# Patient Record
Sex: FEMALE | Race: WHITE | NOT HISPANIC OR LATINO
[De-identification: names, ages, dates, MRNs, and addresses within clinical notes are randomized per-mention and may not be internally consistent; named-entity substitution may affect disease eponyms.]

---

## 2017-02-26 ENCOUNTER — RX RENEWAL (OUTPATIENT)
Age: 37
End: 2017-02-26

## 2017-04-09 ENCOUNTER — RX RENEWAL (OUTPATIENT)
Age: 37
End: 2017-04-09

## 2017-08-03 ENCOUNTER — RX RENEWAL (OUTPATIENT)
Age: 37
End: 2017-08-03

## 2017-08-17 ENCOUNTER — MEDICATION RENEWAL (OUTPATIENT)
Age: 37
End: 2017-08-17

## 2017-12-12 ENCOUNTER — RX RENEWAL (OUTPATIENT)
Age: 37
End: 2017-12-12

## 2018-01-17 ENCOUNTER — RX RENEWAL (OUTPATIENT)
Age: 38
End: 2018-01-17

## 2018-01-18 ENCOUNTER — TRANSCRIPTION ENCOUNTER (OUTPATIENT)
Age: 38
End: 2018-01-18

## 2018-01-26 ENCOUNTER — APPOINTMENT (OUTPATIENT)
Dept: INTERNAL MEDICINE | Facility: CLINIC | Age: 38
End: 2018-01-26
Payer: COMMERCIAL

## 2018-01-26 VITALS
HEIGHT: 61 IN | TEMPERATURE: 97.9 F | SYSTOLIC BLOOD PRESSURE: 110 MMHG | OXYGEN SATURATION: 99 % | DIASTOLIC BLOOD PRESSURE: 80 MMHG | BODY MASS INDEX: 27.38 KG/M2 | WEIGHT: 145 LBS | HEART RATE: 75 BPM

## 2018-01-26 VITALS — DIASTOLIC BLOOD PRESSURE: 75 MMHG | SYSTOLIC BLOOD PRESSURE: 120 MMHG

## 2018-01-26 PROCEDURE — 99395 PREV VISIT EST AGE 18-39: CPT | Mod: 25

## 2018-01-26 PROCEDURE — 36415 COLL VENOUS BLD VENIPUNCTURE: CPT

## 2018-01-27 LAB
ALBUMIN SERPL ELPH-MCNC: 4.5 G/DL
ALP BLD-CCNC: 52 U/L
ALT SERPL-CCNC: 13 U/L
ANION GAP SERPL CALC-SCNC: 15 MMOL/L
APPEARANCE: CLEAR
AST SERPL-CCNC: 21 U/L
BACTERIA: NEGATIVE
BASOPHILS # BLD AUTO: 0.03 K/UL
BASOPHILS NFR BLD AUTO: 0.4 %
BILIRUB SERPL-MCNC: 0.5 MG/DL
BILIRUBIN URINE: NEGATIVE
BLOOD URINE: NEGATIVE
BUN SERPL-MCNC: 14 MG/DL
CALCIUM SERPL-MCNC: 9.7 MG/DL
CHLORIDE SERPL-SCNC: 99 MMOL/L
CHOLEST SERPL-MCNC: 184 MG/DL
CHOLEST/HDLC SERPL: 2.1 RATIO
CO2 SERPL-SCNC: 26 MMOL/L
COLOR: YELLOW
CREAT SERPL-MCNC: 0.92 MG/DL
EOSINOPHIL # BLD AUTO: 0.34 K/UL
EOSINOPHIL NFR BLD AUTO: 4.5 %
GLUCOSE QUALITATIVE U: NEGATIVE MG/DL
GLUCOSE SERPL-MCNC: 72 MG/DL
HBA1C MFR BLD HPLC: 4.8 %
HCT VFR BLD CALC: 42.5 %
HDLC SERPL-MCNC: 89 MG/DL
HGB BLD-MCNC: 14 G/DL
HYALINE CASTS: 0 /LPF
IMM GRANULOCYTES NFR BLD AUTO: 0.3 %
KETONES URINE: ABNORMAL
LDLC SERPL CALC-MCNC: 86 MG/DL
LEUKOCYTE ESTERASE URINE: NEGATIVE
LYMPHOCYTES # BLD AUTO: 2.76 K/UL
LYMPHOCYTES NFR BLD AUTO: 36.2 %
MAN DIFF?: NORMAL
MCHC RBC-ENTMCNC: 30 PG
MCHC RBC-ENTMCNC: 32.9 GM/DL
MCV RBC AUTO: 91 FL
MICROSCOPIC-UA: NORMAL
MONOCYTES # BLD AUTO: 0.58 K/UL
MONOCYTES NFR BLD AUTO: 7.6 %
NEUTROPHILS # BLD AUTO: 3.89 K/UL
NEUTROPHILS NFR BLD AUTO: 51 %
NITRITE URINE: NEGATIVE
PH URINE: 6
PLATELET # BLD AUTO: 212 K/UL
POTASSIUM SERPL-SCNC: 4.5 MMOL/L
PROT SERPL-MCNC: 8.2 G/DL
PROTEIN URINE: NEGATIVE MG/DL
RBC # BLD: 4.67 M/UL
RBC # FLD: 13.2 %
RED BLOOD CELLS URINE: 0 /HPF
SODIUM SERPL-SCNC: 140 MMOL/L
SPECIFIC GRAVITY URINE: 1.01
SQUAMOUS EPITHELIAL CELLS: 1 /HPF
T4 FREE SERPL-MCNC: 1.1 NG/DL
TRIGL SERPL-MCNC: 44 MG/DL
TSH SERPL-ACNC: 2.08 UIU/ML
UROBILINOGEN URINE: NEGATIVE MG/DL
WBC # FLD AUTO: 7.62 K/UL
WHITE BLOOD CELLS URINE: 0 /HPF

## 2018-05-29 ENCOUNTER — RX RENEWAL (OUTPATIENT)
Age: 38
End: 2018-05-29

## 2018-06-13 ENCOUNTER — RESULT REVIEW (OUTPATIENT)
Age: 38
End: 2018-06-13

## 2018-07-05 ENCOUNTER — APPOINTMENT (OUTPATIENT)
Dept: MAMMOGRAPHY | Facility: CLINIC | Age: 38
End: 2018-07-05
Payer: COMMERCIAL

## 2018-07-05 ENCOUNTER — OUTPATIENT (OUTPATIENT)
Dept: OUTPATIENT SERVICES | Facility: HOSPITAL | Age: 38
LOS: 1 days | End: 2018-07-05
Payer: COMMERCIAL

## 2018-07-05 ENCOUNTER — APPOINTMENT (OUTPATIENT)
Dept: ULTRASOUND IMAGING | Facility: CLINIC | Age: 38
End: 2018-07-05
Payer: COMMERCIAL

## 2018-07-05 DIAGNOSIS — Z00.8 ENCOUNTER FOR OTHER GENERAL EXAMINATION: ICD-10-CM

## 2018-07-05 PROCEDURE — 76642 ULTRASOUND BREAST LIMITED: CPT

## 2018-07-05 PROCEDURE — 76641 ULTRASOUND BREAST COMPLETE: CPT

## 2018-07-05 PROCEDURE — 77066 DX MAMMO INCL CAD BI: CPT | Mod: 26

## 2018-07-05 PROCEDURE — G0279: CPT | Mod: 26

## 2018-07-05 PROCEDURE — G0279: CPT

## 2018-07-05 PROCEDURE — 77066 DX MAMMO INCL CAD BI: CPT

## 2018-07-05 PROCEDURE — 76642 ULTRASOUND BREAST LIMITED: CPT | Mod: 26,LT

## 2018-10-01 ENCOUNTER — APPOINTMENT (OUTPATIENT)
Dept: INTERNAL MEDICINE | Facility: CLINIC | Age: 38
End: 2018-10-01
Payer: SELF-PAY

## 2018-10-01 VITALS
BODY MASS INDEX: 27.38 KG/M2 | WEIGHT: 145 LBS | SYSTOLIC BLOOD PRESSURE: 120 MMHG | HEIGHT: 61 IN | DIASTOLIC BLOOD PRESSURE: 60 MMHG | OXYGEN SATURATION: 96 % | HEART RATE: 84 BPM | TEMPERATURE: 98.5 F

## 2018-10-01 PROCEDURE — 99213 OFFICE O/P EST LOW 20 MIN: CPT

## 2018-10-01 NOTE — PHYSICAL EXAM
[No Acute Distress] : no acute distress [Well Nourished] : well nourished [Well Developed] : well developed [Normal Oropharynx] : the oropharynx was normal [Normal TMs] : both tympanic membranes were normal [Supple] : supple [No Lymphadenopathy] : no lymphadenopathy [No Respiratory Distress] : no respiratory distress  [Clear to Auscultation] : lungs were clear to auscultation bilaterally [Normal Rate] : normal rate  [Normal S1, S2] : normal S1 and S2 [No Murmur] : no murmur heard [No Edema] : there was no peripheral edema [Soft] : abdomen soft [Non Tender] : non-tender [de-identified] : OP clear, ears clear

## 2018-10-01 NOTE — HISTORY OF PRESENT ILLNESS
[FreeTextEntry8] : Pt comes for an acute visit.\par \par She complains of productive cough of yellow-green sputum, dyspnea, low grade fever, and chest congestion x 3 days. Started on Friday. She has intermittent fever w/ chills. Her 4 yr old son has been sick with bronchitis. 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 She is taking albuterol inhaler with some relief. She is using Nyquil.

## 2018-10-01 NOTE — ASSESSMENT
[FreeTextEntry1] : Suspect URI likely bronchitis. Doubtful of influenza. Send Z-pack. Albuterol inhaler refilled. Advised rest, fluids, NSAIDs or tylenol. Monitor temps. Dayquil and Nyquil PRN. Call back office PRN.

## 2018-10-01 NOTE — REVIEW OF SYSTEMS
[Fever] : fever [Chills] : chills [Fatigue] : fatigue [Earache] : no earache [Nasal Discharge] : no nasal discharge [Sore Throat] : no sore throat [Chest Pain] : no chest pain [Shortness Of Breath] : shortness of breath [Wheezing] : no wheezing [Cough] : cough [Abdominal Pain] : no abdominal pain [Nausea] : no nausea [Diarrhea] : diarrhea

## 2018-10-19 ENCOUNTER — MEDICATION RENEWAL (OUTPATIENT)
Age: 38
End: 2018-10-19

## 2019-01-31 ENCOUNTER — APPOINTMENT (OUTPATIENT)
Dept: INTERNAL MEDICINE | Facility: CLINIC | Age: 39
End: 2019-01-31
Payer: COMMERCIAL

## 2019-01-31 VITALS — SYSTOLIC BLOOD PRESSURE: 110 MMHG | DIASTOLIC BLOOD PRESSURE: 62 MMHG

## 2019-01-31 VITALS
BODY MASS INDEX: 27.56 KG/M2 | HEART RATE: 75 BPM | TEMPERATURE: 98.1 F | DIASTOLIC BLOOD PRESSURE: 60 MMHG | SYSTOLIC BLOOD PRESSURE: 100 MMHG | WEIGHT: 146 LBS | HEIGHT: 61 IN | OXYGEN SATURATION: 99 %

## 2019-01-31 DIAGNOSIS — Z87.09 PERSONAL HISTORY OF OTHER DISEASES OF THE RESPIRATORY SYSTEM: ICD-10-CM

## 2019-01-31 PROCEDURE — G0444 DEPRESSION SCREEN ANNUAL: CPT

## 2019-01-31 PROCEDURE — 36415 COLL VENOUS BLD VENIPUNCTURE: CPT

## 2019-01-31 PROCEDURE — 99395 PREV VISIT EST AGE 18-39: CPT | Mod: 25

## 2019-01-31 RX ORDER — AZITHROMYCIN 250 MG/1
250 TABLET, FILM COATED ORAL
Qty: 1 | Refills: 0 | Status: DISCONTINUED | COMMUNITY
Start: 2018-10-01 | End: 2019-01-31

## 2019-03-15 LAB
ALBUMIN SERPL ELPH-MCNC: 4.6 G/DL
ALP BLD-CCNC: 45 U/L
ALT SERPL-CCNC: 11 U/L
ANION GAP SERPL CALC-SCNC: 12 MMOL/L
APPEARANCE: CLEAR
AST SERPL-CCNC: 15 U/L
BACTERIA: NEGATIVE
BASOPHILS # BLD AUTO: 0.04 K/UL
BASOPHILS NFR BLD AUTO: 0.6 %
BILIRUB SERPL-MCNC: 0.4 MG/DL
BILIRUBIN URINE: NEGATIVE
BLOOD URINE: NEGATIVE
BUN SERPL-MCNC: 13 MG/DL
CALCIUM SERPL-MCNC: 9.1 MG/DL
CHLORIDE SERPL-SCNC: 99 MMOL/L
CHOLEST SERPL-MCNC: 173 MG/DL
CHOLEST/HDLC SERPL: 1.7 RATIO
CO2 SERPL-SCNC: 26 MMOL/L
COLOR: YELLOW
CREAT SERPL-MCNC: 0.66 MG/DL
EOSINOPHIL # BLD AUTO: 0.21 K/UL
EOSINOPHIL NFR BLD AUTO: 3.2 %
GLUCOSE QUALITATIVE U: NEGATIVE MG/DL
GLUCOSE SERPL-MCNC: 126 MG/DL
HBA1C MFR BLD HPLC: 4.7 %
HCT VFR BLD CALC: 40.2 %
HDLC SERPL-MCNC: 99 MG/DL
HGB BLD-MCNC: 13.1 G/DL
HYALINE CASTS: 0 /LPF
IMM GRANULOCYTES NFR BLD AUTO: 0.2 %
KETONES URINE: ABNORMAL
LDLC SERPL CALC-MCNC: 69 MG/DL
LEUKOCYTE ESTERASE URINE: NEGATIVE
LYMPHOCYTES # BLD AUTO: 2.26 K/UL
LYMPHOCYTES NFR BLD AUTO: 34.8 %
MAN DIFF?: NORMAL
MCHC RBC-ENTMCNC: 29.5 PG
MCHC RBC-ENTMCNC: 32.6 GM/DL
MCV RBC AUTO: 90.5 FL
MICROSCOPIC-UA: NORMAL
MONOCYTES # BLD AUTO: 0.6 K/UL
MONOCYTES NFR BLD AUTO: 9.2 %
NEUTROPHILS # BLD AUTO: 3.37 K/UL
NEUTROPHILS NFR BLD AUTO: 52 %
NITRITE URINE: NEGATIVE
PH URINE: 6.5
PLATELET # BLD AUTO: 213 K/UL
POTASSIUM SERPL-SCNC: 3.5 MMOL/L
PROT SERPL-MCNC: 7.4 G/DL
PROTEIN URINE: NEGATIVE MG/DL
RBC # BLD: 4.44 M/UL
RBC # FLD: 13.8 %
RED BLOOD CELLS URINE: 3 /HPF
SODIUM SERPL-SCNC: 137 MMOL/L
SPECIFIC GRAVITY URINE: 1.03
SQUAMOUS EPITHELIAL CELLS: 1 /HPF
T4 FREE SERPL-MCNC: 1 NG/DL
TRIGL SERPL-MCNC: 27 MG/DL
TSH SERPL-ACNC: 2.46 UIU/ML
UROBILINOGEN URINE: NEGATIVE MG/DL
WBC # FLD AUTO: 6.49 K/UL
WHITE BLOOD CELLS URINE: 0 /HPF

## 2019-03-15 NOTE — ASSESSMENT
[FreeTextEntry1] : She feels well and is doing well from a psychiatric standpoint.  Continue Fluoxetine 50 mg.\par \par Discussed diet and exercise.  She should try to be more careful.  Check routine blood tests- note she is not fasting.\par \par She smokes an occasional cigar and she was counseled to quit.\par \par She saw her gyn 6/18.  Mammogram and breast U/S 7/18- she was advised to have the reports sent.  \par \par She sees her dermatologist yearly.\par \par She isn't sure if she had the flu vaccine and she will check with her pharmacy.  She should have it if not yet done.

## 2019-03-15 NOTE — HISTORY OF PRESENT ILLNESS
[FreeTextEntry1] : The patient is here for a routine visit.  [de-identified] : She feels well.  She exercises three days per week.  She tries to watch her diet but she is not strict.  \par \par The anxiety, depression and OCD are very well controlled on the Fluoxetine 50 mg and she feels well.\par \par Imitrex works well for the migraines which she has a few times per month.

## 2019-03-15 NOTE — HEALTH RISK ASSESSMENT
[No falls in past year] : Patient reported no falls in the past year [0] : 2) Feeling down, depressed, or hopeless: Not at all (0) [] : No [[id number]] : 0 [Change in mental status noted] : No change in mental status noted [Reports changes in hearing] : Reports no changes in hearing [Reports changes in vision] : Reports no changes in vision [Reports changes in dental health] : Reports no changes in dental health

## 2019-04-05 ENCOUNTER — MEDICATION RENEWAL (OUTPATIENT)
Age: 39
End: 2019-04-05

## 2019-04-23 ENCOUNTER — APPOINTMENT (OUTPATIENT)
Dept: INTERNAL MEDICINE | Facility: CLINIC | Age: 39
End: 2019-04-23
Payer: COMMERCIAL

## 2019-04-23 VITALS
TEMPERATURE: 98.2 F | WEIGHT: 144 LBS | SYSTOLIC BLOOD PRESSURE: 120 MMHG | HEART RATE: 84 BPM | OXYGEN SATURATION: 98 % | HEIGHT: 61 IN | DIASTOLIC BLOOD PRESSURE: 70 MMHG | BODY MASS INDEX: 27.19 KG/M2

## 2019-04-23 DIAGNOSIS — F41.9 ANXIETY DISORDER, UNSPECIFIED: ICD-10-CM

## 2019-04-23 PROCEDURE — 99213 OFFICE O/P EST LOW 20 MIN: CPT

## 2019-04-23 NOTE — HISTORY OF PRESENT ILLNESS
[de-identified] : The patient has a very stressful job situation.  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  She finds it anxiety-provoking and she has trouble relaxing at home.  She says Alprazolam has helped her in the past and she requests a renewal. \par \par She is doing well from a depression and OCD standpoint.

## 2019-04-23 NOTE — ASSESSMENT
[FreeTextEntry1] : She was counseled regarding the situation at work.  She has anxiety and trouble relaxing after getting home.  I agree Alprazolam is appropriate and she was given a prescription for 0.25 mg which has helped in the past.  She will only take it when home and she knows not to combine with ETOH or take when driving or at work.  Call PRN.\par \par She is doing well otherwise from a psychiatric standpoint and the Fluoxetine 50 mg was renewed.

## 2019-04-23 NOTE — PHYSICAL EXAM
[No Acute Distress] : no acute distress [Well Nourished] : well nourished [Well Developed] : well developed [Coordination Grossly Intact] : coordination grossly intact [Normal Gait] : normal gait [No Focal Deficits] : no focal deficits [Memory Grossly Normal] : memory grossly normal [Speech Grossly Normal] : speech grossly normal [Normal Mood] : the mood was normal [Normal Affect] : the affect was normal

## 2019-06-12 ENCOUNTER — APPOINTMENT (OUTPATIENT)
Dept: INTERNAL MEDICINE | Facility: CLINIC | Age: 39
End: 2019-06-12
Payer: COMMERCIAL

## 2019-06-12 VITALS
BODY MASS INDEX: 27.75 KG/M2 | SYSTOLIC BLOOD PRESSURE: 100 MMHG | OXYGEN SATURATION: 99 % | HEART RATE: 73 BPM | DIASTOLIC BLOOD PRESSURE: 70 MMHG | HEIGHT: 61 IN | TEMPERATURE: 98 F | WEIGHT: 147 LBS

## 2019-06-12 PROCEDURE — 99213 OFFICE O/P EST LOW 20 MIN: CPT | Mod: 25

## 2019-06-12 PROCEDURE — 87880 STREP A ASSAY W/OPTIC: CPT | Mod: QW

## 2019-06-12 NOTE — REVIEW OF SYSTEMS
[Fever] : fever [Chills] : chills [Fatigue] : fatigue [Shortness Of Breath] : shortness of breath [Cough] : cough [Earache] : no earache [Chest Pain] : no chest pain [Sore Throat] : no sore throat [Nasal Discharge] : no nasal discharge [Wheezing] : no wheezing [Abdominal Pain] : no abdominal pain [Nausea] : no nausea [Diarrhea] : diarrhea

## 2019-06-12 NOTE — HISTORY OF PRESENT ILLNESS
[FreeTextEntry8] : Patient comes for an acute visit. \par \par She is here for evaluation of sore throat, cough, and fatigue x 3 days. Getting worse. Cough is dry. Has green nasal discharge. She had pink eye last week and took course of Ofloxacin drops (prescribed by her daughter's pediatrician). No fever or chills. Her daughter was recently sick with sore throat, strep testing was negative. She is taking Advil cold & sinus. \par

## 2019-06-12 NOTE — PHYSICAL EXAM
[No Acute Distress] : no acute distress [Well Nourished] : well nourished [Normal Oropharynx] : the oropharynx was normal [Well Developed] : well developed [Supple] : supple [Normal TMs] : both tympanic membranes were normal [No Lymphadenopathy] : no lymphadenopathy [Clear to Auscultation] : lungs were clear to auscultation bilaterally [Normal Rate] : normal rate  [No Respiratory Distress] : no respiratory distress  [Normal S1, S2] : normal S1 and S2 [No Edema] : there was no peripheral edema [No Murmur] : no murmur heard [Soft] : abdomen soft [Non Tender] : non-tender [No Rash] : no rash [Normal Mood] : the mood was normal [Normal Gait] : normal gait [de-identified] : OP with minimal erythema but no exudate, ears clear

## 2019-06-12 NOTE — ASSESSMENT
[FreeTextEntry1] : Patient likely with viral URI. Rapid strep testing was negative. Continue Advil cold & sinus. Rest, fluids, lozenges. If sx worsen in next few days, patient advised to call back office. Excuse note for work given to patient.

## 2019-07-17 ENCOUNTER — MEDICATION RENEWAL (OUTPATIENT)
Age: 39
End: 2019-07-17

## 2019-10-23 ENCOUNTER — MEDICATION RENEWAL (OUTPATIENT)
Age: 39
End: 2019-10-23

## 2019-12-31 ENCOUNTER — RESULT REVIEW (OUTPATIENT)
Age: 39
End: 2019-12-31

## 2020-01-04 ENCOUNTER — RX RENEWAL (OUTPATIENT)
Age: 40
End: 2020-01-04

## 2020-01-07 ENCOUNTER — MEDICATION RENEWAL (OUTPATIENT)
Age: 40
End: 2020-01-07

## 2020-04-03 ENCOUNTER — APPOINTMENT (OUTPATIENT)
Dept: INTERNAL MEDICINE | Facility: CLINIC | Age: 40
End: 2020-04-03
Payer: COMMERCIAL

## 2020-04-03 PROCEDURE — G2012 BRIEF CHECK IN BY MD/QHP: CPT

## 2020-04-17 ENCOUNTER — APPOINTMENT (OUTPATIENT)
Dept: INTERNAL MEDICINE | Facility: CLINIC | Age: 40
End: 2020-04-17

## 2020-05-04 ENCOUNTER — RX RENEWAL (OUTPATIENT)
Age: 40
End: 2020-05-04

## 2020-09-01 ENCOUNTER — APPOINTMENT (OUTPATIENT)
Dept: INTERNAL MEDICINE | Facility: CLINIC | Age: 40
End: 2020-09-01
Payer: COMMERCIAL

## 2020-09-01 VITALS
BODY MASS INDEX: 26.68 KG/M2 | WEIGHT: 145 LBS | TEMPERATURE: 97.8 F | HEART RATE: 66 BPM | OXYGEN SATURATION: 98 % | HEIGHT: 62 IN

## 2020-09-01 DIAGNOSIS — Z87.09 PERSONAL HISTORY OF OTHER DISEASES OF THE RESPIRATORY SYSTEM: ICD-10-CM

## 2020-09-01 DIAGNOSIS — F41.8 OTHER SPECIFIED ANXIETY DISORDERS: ICD-10-CM

## 2020-09-01 PROCEDURE — 99214 OFFICE O/P EST MOD 30 MIN: CPT

## 2020-09-08 NOTE — ASSESSMENT
[FreeTextEntry1] : Her medical situation was reviewed and she was counseled.  We will provide a letter for her  for work advising that she work remotely given her medical condition.  \par \par She is doing well from a psychiatric standpoint.  She was counseled.  Alprazolam renewed.  \par \par CDC guidelines regarding asthma reviewed- required for the letter.

## 2020-09-08 NOTE — HISTORY OF PRESENT ILLNESS
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  She will be going back to work as the school year starts and she is concerned given COVID-19.  She has a history of asthma, bronchitis, frequent URIs.    She is requesting a medical accommodation to work remotely from home given her medical conditions.  She needs a letter documenting her medical condition.  \par \par She is doing well from an anxiety, depression and OCD standpoint.  Stable on meds.

## 2020-12-01 ENCOUNTER — APPOINTMENT (OUTPATIENT)
Dept: INTERNAL MEDICINE | Facility: CLINIC | Age: 40
End: 2020-12-01
Payer: COMMERCIAL

## 2020-12-01 VITALS
TEMPERATURE: 98.2 F | OXYGEN SATURATION: 97 % | RESPIRATION RATE: 16 BRPM | BODY MASS INDEX: 27.21 KG/M2 | HEIGHT: 61.5 IN | WEIGHT: 146 LBS | HEART RATE: 78 BPM

## 2020-12-01 DIAGNOSIS — J06.9 ACUTE UPPER RESPIRATORY INFECTION, UNSPECIFIED: ICD-10-CM

## 2020-12-01 PROCEDURE — 90686 IIV4 VACC NO PRSV 0.5 ML IM: CPT

## 2020-12-01 PROCEDURE — 99396 PREV VISIT EST AGE 40-64: CPT | Mod: 25

## 2020-12-01 PROCEDURE — 36415 COLL VENOUS BLD VENIPUNCTURE: CPT

## 2020-12-01 PROCEDURE — G0008: CPT

## 2020-12-01 PROCEDURE — G0444 DEPRESSION SCREEN ANNUAL: CPT

## 2020-12-01 PROCEDURE — 99072 ADDL SUPL MATRL&STAF TM PHE: CPT

## 2020-12-01 RX ORDER — OFLOXACIN 3 MG/ML
0.3 SOLUTION/ DROPS OPHTHALMIC
Qty: 5 | Refills: 0 | Status: DISCONTINUED | COMMUNITY
Start: 2019-06-05 | End: 2020-12-01

## 2020-12-03 ENCOUNTER — RX RENEWAL (OUTPATIENT)
Age: 40
End: 2020-12-03

## 2020-12-07 ENCOUNTER — NON-APPOINTMENT (OUTPATIENT)
Age: 40
End: 2020-12-07

## 2020-12-07 VITALS — DIASTOLIC BLOOD PRESSURE: 72 MMHG | SYSTOLIC BLOOD PRESSURE: 120 MMHG

## 2020-12-07 LAB
25(OH)D3 SERPL-MCNC: 35.1 NG/ML
ALBUMIN SERPL ELPH-MCNC: 4.9 G/DL
ALP BLD-CCNC: 55 U/L
ALT SERPL-CCNC: 14 U/L
ANION GAP SERPL CALC-SCNC: 16 MMOL/L
APPEARANCE: CLEAR
AST SERPL-CCNC: 17 U/L
BACTERIA: NEGATIVE
BASOPHILS # BLD AUTO: 0.06 K/UL
BASOPHILS NFR BLD AUTO: 0.6 %
BILIRUB SERPL-MCNC: 0.4 MG/DL
BILIRUBIN URINE: NEGATIVE
BLOOD URINE: NEGATIVE
BUN SERPL-MCNC: 11 MG/DL
CALCIUM SERPL-MCNC: 10 MG/DL
CHLORIDE SERPL-SCNC: 99 MMOL/L
CHOLEST SERPL-MCNC: 218 MG/DL
CO2 SERPL-SCNC: 23 MMOL/L
COLOR: COLORLESS
CREAT SERPL-MCNC: 0.71 MG/DL
EOSINOPHIL # BLD AUTO: 0.26 K/UL
EOSINOPHIL NFR BLD AUTO: 2.7 %
ESTIMATED AVERAGE GLUCOSE: 91 MG/DL
GLUCOSE QUALITATIVE U: NEGATIVE
GLUCOSE SERPL-MCNC: 73 MG/DL
HBA1C MFR BLD HPLC: 4.8 %
HCT VFR BLD CALC: 44 %
HDLC SERPL-MCNC: 114 MG/DL
HGB BLD-MCNC: 14.5 G/DL
HYALINE CASTS: 1 /LPF
IMM GRANULOCYTES NFR BLD AUTO: 0.2 %
KETONES URINE: ABNORMAL
LDLC SERPL CALC-MCNC: 94 MG/DL
LEUKOCYTE ESTERASE URINE: NEGATIVE
LYMPHOCYTES # BLD AUTO: 2.16 K/UL
LYMPHOCYTES NFR BLD AUTO: 22.7 %
MAN DIFF?: NORMAL
MCHC RBC-ENTMCNC: 30.9 PG
MCHC RBC-ENTMCNC: 33 GM/DL
MCV RBC AUTO: 93.8 FL
MICROSCOPIC-UA: NORMAL
MONOCYTES # BLD AUTO: 0.63 K/UL
MONOCYTES NFR BLD AUTO: 6.6 %
NEUTROPHILS # BLD AUTO: 6.37 K/UL
NEUTROPHILS NFR BLD AUTO: 67.2 %
NITRITE URINE: NEGATIVE
NONHDLC SERPL-MCNC: 104 MG/DL
PH URINE: 6.5
PLATELET # BLD AUTO: 223 K/UL
POTASSIUM SERPL-SCNC: 4.1 MMOL/L
PROT SERPL-MCNC: 7.5 G/DL
PROTEIN URINE: NEGATIVE
RBC # BLD: 4.69 M/UL
RBC # FLD: 13.4 %
RED BLOOD CELLS URINE: 6 /HPF
SODIUM SERPL-SCNC: 138 MMOL/L
SPECIFIC GRAVITY URINE: 1.01
SQUAMOUS EPITHELIAL CELLS: 0 /HPF
T4 FREE SERPL-MCNC: 1.1 NG/DL
TRIGL SERPL-MCNC: 49 MG/DL
TSH SERPL-ACNC: 2.31 UIU/ML
UROBILINOGEN URINE: NORMAL
WBC # FLD AUTO: 9.5 K/UL
WHITE BLOOD CELLS URINE: 0 /HPF

## 2020-12-07 NOTE — ASSESSMENT
[FreeTextEntry1] : She was counseled regarding the depression symptoms which may be worse.  Will add Wellbutrin 75 mg (she wants to add the lowest dose) and continue Fluoxetine 50 mg.  Follow-up in a month.\par \par Discussed diet and exercise.  \par \par Flu vaccine today.\par \par She saw her gyn in 10/20.  Mammogram and breast U/S pending.  \par \par She sees a dermatologist.

## 2020-12-07 NOTE — HEALTH RISK ASSESSMENT
[No] : No [No falls in past year] : Patient reported no falls in the past year [2] : 1) Little interest or pleasure doing things for more than half of the days (2) [1] : 2) Feeling down, depressed, or hopeless for several days (1) [] : No [[id number]] : 3

## 2020-12-07 NOTE — HISTORY OF PRESENT ILLNESS
[FreeTextEntry1] : The patient is here for a routine visit.  [de-identified] : She has been under stress due to the pandemic and she says she is feeling more sad.  She is teaching remotely.  The OCD symptoms are controlled. Her  is also working from home. Her two children, 10 and 6, go to school. \par \par She does some walking.  Diet is fair.

## 2021-01-07 ENCOUNTER — RX RENEWAL (OUTPATIENT)
Age: 41
End: 2021-01-07

## 2021-02-03 ENCOUNTER — RX RENEWAL (OUTPATIENT)
Age: 41
End: 2021-02-03

## 2021-02-07 ENCOUNTER — NON-APPOINTMENT (OUTPATIENT)
Age: 41
End: 2021-02-07

## 2021-02-24 ENCOUNTER — OUTPATIENT (OUTPATIENT)
Dept: OUTPATIENT SERVICES | Facility: HOSPITAL | Age: 41
LOS: 1 days | End: 2021-02-24
Payer: COMMERCIAL

## 2021-02-24 ENCOUNTER — APPOINTMENT (OUTPATIENT)
Dept: ULTRASOUND IMAGING | Facility: CLINIC | Age: 41
End: 2021-02-24
Payer: COMMERCIAL

## 2021-02-24 ENCOUNTER — APPOINTMENT (OUTPATIENT)
Dept: MAMMOGRAPHY | Facility: CLINIC | Age: 41
End: 2021-02-24
Payer: COMMERCIAL

## 2021-02-24 DIAGNOSIS — Z00.8 ENCOUNTER FOR OTHER GENERAL EXAMINATION: ICD-10-CM

## 2021-02-24 PROCEDURE — 77063 BREAST TOMOSYNTHESIS BI: CPT

## 2021-02-24 PROCEDURE — 77067 SCR MAMMO BI INCL CAD: CPT | Mod: 26

## 2021-02-24 PROCEDURE — 76641 ULTRASOUND BREAST COMPLETE: CPT | Mod: 26,50

## 2021-02-24 PROCEDURE — 77063 BREAST TOMOSYNTHESIS BI: CPT | Mod: 26

## 2021-02-24 PROCEDURE — 76641 ULTRASOUND BREAST COMPLETE: CPT

## 2021-02-24 PROCEDURE — 77067 SCR MAMMO BI INCL CAD: CPT

## 2021-03-01 ENCOUNTER — RESULT REVIEW (OUTPATIENT)
Age: 41
End: 2021-03-01

## 2021-08-22 RX ORDER — BUPROPION HYDROCHLORIDE 75 MG/1
75 TABLET, FILM COATED ORAL
Qty: 90 | Refills: 1 | Status: DISCONTINUED | COMMUNITY
Start: 2020-12-01 | End: 2021-08-22

## 2021-09-10 ENCOUNTER — RX RENEWAL (OUTPATIENT)
Age: 41
End: 2021-09-10

## 2021-09-13 ENCOUNTER — NON-APPOINTMENT (OUTPATIENT)
Age: 41
End: 2021-09-13

## 2021-11-29 ENCOUNTER — RX RENEWAL (OUTPATIENT)
Age: 41
End: 2021-11-29

## 2022-03-10 ENCOUNTER — NON-APPOINTMENT (OUTPATIENT)
Age: 42
End: 2022-03-10

## 2022-03-10 ENCOUNTER — APPOINTMENT (OUTPATIENT)
Dept: INTERNAL MEDICINE | Facility: CLINIC | Age: 42
End: 2022-03-10
Payer: COMMERCIAL

## 2022-03-10 VITALS
HEIGHT: 61.5 IN | HEART RATE: 89 BPM | WEIGHT: 152 LBS | BODY MASS INDEX: 28.33 KG/M2 | TEMPERATURE: 97.8 F | OXYGEN SATURATION: 98 %

## 2022-03-10 DIAGNOSIS — Z92.29 PERSONAL HISTORY OF OTHER DRUG THERAPY: ICD-10-CM

## 2022-03-10 PROCEDURE — 93000 ELECTROCARDIOGRAM COMPLETE: CPT | Mod: 59

## 2022-03-10 PROCEDURE — G0444 DEPRESSION SCREEN ANNUAL: CPT | Mod: 59

## 2022-03-10 PROCEDURE — 99396 PREV VISIT EST AGE 40-64: CPT | Mod: 25

## 2022-03-11 ENCOUNTER — TRANSCRIPTION ENCOUNTER (OUTPATIENT)
Age: 42
End: 2022-03-11

## 2022-04-13 VITALS — SYSTOLIC BLOOD PRESSURE: 110 MMHG | DIASTOLIC BLOOD PRESSURE: 70 MMHG

## 2022-04-13 LAB
ALBUMIN SERPL ELPH-MCNC: 4.8 G/DL
ALP BLD-CCNC: 58 U/L
ALT SERPL-CCNC: 12 U/L
ANION GAP SERPL CALC-SCNC: 14 MMOL/L
APPEARANCE: CLEAR
AST SERPL-CCNC: 18 U/L
BACTERIA: NEGATIVE
BASOPHILS # BLD AUTO: 0.07 K/UL
BASOPHILS NFR BLD AUTO: 0.7 %
BILIRUB SERPL-MCNC: 0.3 MG/DL
BILIRUBIN URINE: NEGATIVE
BLOOD URINE: NEGATIVE
BUN SERPL-MCNC: 15 MG/DL
CALCIUM SERPL-MCNC: 9.6 MG/DL
CHLORIDE SERPL-SCNC: 98 MMOL/L
CHOLEST SERPL-MCNC: 201 MG/DL
CO2 SERPL-SCNC: 26 MMOL/L
COLOR: YELLOW
COVID-19 NUCLEOCAPSID  GAM ANTIBODY INTERPRETATION: NEGATIVE
COVID-19 SPIKE DOMAIN ANTIBODY INTERPRETATION: POSITIVE
CREAT SERPL-MCNC: 0.73 MG/DL
EGFR: 105 ML/MIN/1.73M2
EOSINOPHIL # BLD AUTO: 0.21 K/UL
EOSINOPHIL NFR BLD AUTO: 2.1 %
ESTIMATED AVERAGE GLUCOSE: 94 MG/DL
GLUCOSE QUALITATIVE U: NEGATIVE
GLUCOSE SERPL-MCNC: 81 MG/DL
HBA1C MFR BLD HPLC: 4.9 %
HCT VFR BLD CALC: 46.5 %
HDLC SERPL-MCNC: 100 MG/DL
HGB BLD-MCNC: 15 G/DL
HYALINE CASTS: 0 /LPF
IMM GRANULOCYTES NFR BLD AUTO: 0.1 %
KETONES URINE: NEGATIVE
LDLC SERPL CALC-MCNC: 92 MG/DL
LEUKOCYTE ESTERASE URINE: NEGATIVE
LYMPHOCYTES # BLD AUTO: 2.43 K/UL
LYMPHOCYTES NFR BLD AUTO: 24.5 %
MAN DIFF?: NORMAL
MCHC RBC-ENTMCNC: 30.5 PG
MCHC RBC-ENTMCNC: 32.3 GM/DL
MCV RBC AUTO: 94.5 FL
MICROSCOPIC-UA: NORMAL
MONOCYTES # BLD AUTO: 0.82 K/UL
MONOCYTES NFR BLD AUTO: 8.3 %
NEUTROPHILS # BLD AUTO: 6.36 K/UL
NEUTROPHILS NFR BLD AUTO: 64.3 %
NITRITE URINE: NEGATIVE
NONHDLC SERPL-MCNC: 101 MG/DL
PH URINE: 7.5
PLATELET # BLD AUTO: 214 K/UL
POTASSIUM SERPL-SCNC: 4.5 MMOL/L
PROT SERPL-MCNC: 7.2 G/DL
PROTEIN URINE: NORMAL
RBC # BLD: 4.92 M/UL
RBC # FLD: 13.7 %
RED BLOOD CELLS URINE: 5 /HPF
SARS-COV-2 AB SERPL IA-ACNC: 106 U/ML
SARS-COV-2 AB SERPL QL IA: 0.08 INDEX
SODIUM SERPL-SCNC: 137 MMOL/L
SPECIFIC GRAVITY URINE: 1.03
SQUAMOUS EPITHELIAL CELLS: 1 /HPF
T4 FREE SERPL-MCNC: 1 NG/DL
TRIGL SERPL-MCNC: 42 MG/DL
TSH SERPL-ACNC: 1.92 UIU/ML
URINE CYTOLOGY: NORMAL
UROBILINOGEN URINE: NORMAL
WBC # FLD AUTO: 9.9 K/UL
WHITE BLOOD CELLS URINE: 0 /HPF

## 2022-04-13 NOTE — ASSESSMENT
[FreeTextEntry1] : She is doing well from an anxiety standpoint and she was counseled.  Continue Sertraline 150 mg and Alprazolam PRN.  \par \par Discussed diet and exercise.  Check lipids.\par \par She requests COVID-19 antibodies.  S/p COVID-19 vaccine and the booster was advised.\par \par She is seeing her gyn soon.  Mammogram and breast U/S advised.\par \par She sees a dermatologist.\par \par Check a U/A and cytology- no symptoms.  She didn't return to check the urine last time.

## 2022-04-13 NOTE — HISTORY OF PRESENT ILLNESS
[FreeTextEntry1] : The patient is here for a routine visit.  [de-identified] : She hasn't been exercising.  Diet not good.  \par \par She has two children, 11 and 7.\par \par No  symptoms.  \par \par She reports she had a breast biopsy last year that was negative.\par \par Migraines are the same.  \par \par She is doing ok on the Sertraline 150 mg.

## 2022-04-13 NOTE — HEALTH RISK ASSESSMENT
[Never] : Never [No] : No [No falls in past year] : Patient reported no falls in the past year [0] : 2) Feeling down, depressed, or hopeless: Not at all (0) [Fully functional (bathing, dressing, toileting, transferring, walking, feeding)] : Fully functional (bathing, dressing, toileting, transferring, walking, feeding) [Fully functional (using the telephone, shopping, preparing meals, housekeeping, doing laundry, using] : Fully functional and needs no help or supervision to perform IADLs (using the telephone, shopping, preparing meals, housekeeping, doing laundry, using transportation, managing medications and managing finances) [[id number]] : 0 [Reports changes in hearing] : Reports no changes in hearing [Reports changes in vision] : Reports no changes in vision [Reports changes in dental health] : Reports no changes in dental health

## 2022-04-17 ENCOUNTER — NON-APPOINTMENT (OUTPATIENT)
Age: 42
End: 2022-04-17

## 2022-04-19 DIAGNOSIS — R92.8 OTHER ABNORMAL AND INCONCLUSIVE FINDINGS ON DIAGNOSTIC IMAGING OF BREAST: ICD-10-CM

## 2022-04-24 ENCOUNTER — NON-APPOINTMENT (OUTPATIENT)
Age: 42
End: 2022-04-24

## 2022-07-19 ENCOUNTER — NON-APPOINTMENT (OUTPATIENT)
Age: 42
End: 2022-07-19

## 2022-07-23 ENCOUNTER — NON-APPOINTMENT (OUTPATIENT)
Age: 42
End: 2022-07-23

## 2022-11-04 ENCOUNTER — RX RENEWAL (OUTPATIENT)
Age: 42
End: 2022-11-04

## 2022-12-20 ENCOUNTER — RX RENEWAL (OUTPATIENT)
Age: 42
End: 2022-12-20

## 2022-12-21 ENCOUNTER — RX RENEWAL (OUTPATIENT)
Age: 42
End: 2022-12-21

## 2023-04-18 ENCOUNTER — APPOINTMENT (OUTPATIENT)
Dept: INTERNAL MEDICINE | Facility: CLINIC | Age: 43
End: 2023-04-18
Payer: COMMERCIAL

## 2023-04-18 VITALS
WEIGHT: 158 LBS | TEMPERATURE: 97.9 F | HEIGHT: 61.5 IN | HEART RATE: 68 BPM | BODY MASS INDEX: 29.45 KG/M2 | OXYGEN SATURATION: 98 %

## 2023-04-18 VITALS — DIASTOLIC BLOOD PRESSURE: 60 MMHG | SYSTOLIC BLOOD PRESSURE: 110 MMHG

## 2023-04-18 PROCEDURE — 36415 COLL VENOUS BLD VENIPUNCTURE: CPT

## 2023-04-18 PROCEDURE — G0444 DEPRESSION SCREEN ANNUAL: CPT | Mod: 59

## 2023-04-18 PROCEDURE — 99396 PREV VISIT EST AGE 40-64: CPT | Mod: 25

## 2023-04-18 RX ORDER — ALBUTEROL SULFATE 90 UG/1
108 (90 BASE) INHALANT RESPIRATORY (INHALATION)
Qty: 1 | Refills: 3 | Status: ACTIVE | COMMUNITY
Start: 2020-04-08 | End: 1900-01-01

## 2023-04-18 RX ORDER — DOXYCYCLINE HYCLATE 100 MG/1
100 TABLET ORAL
Qty: 14 | Refills: 0 | Status: DISCONTINUED | COMMUNITY
Start: 2022-04-28 | End: 2023-04-18

## 2023-04-18 RX ORDER — METHYLPREDNISOLONE 4 MG/1
4 TABLET ORAL
Qty: 1 | Refills: 1 | Status: DISCONTINUED | COMMUNITY
Start: 2022-04-28 | End: 2023-04-18

## 2023-04-18 NOTE — HISTORY OF PRESENT ILLNESS
[FreeTextEntry1] : The patient is here for a routine visit.  [de-identified] : She feels well.  Her diet isn't good.  She walks and exercises 3-4 days per week.  \par \par She is doing well on Sertraline with control of the anxiety.  She usually takes an Alprazolam QHS which works well.  \par \par Work and home are busy.  She has two children, 12 and 8.\par \par No urinary symptoms.

## 2023-04-18 NOTE — ASSESSMENT
[FreeTextEntry1] : Discussed diet and exercise and she was advised to lose weight.  Check lipids.  She asks about weight loss medications- could consider Ozempic which she will consider.\par \par Mammogram and breast U/S due and advised.  She had a benign breast biopsy last year.\par \par She will see her gyn.\par \par She sees a dermatologist.\par \par S/p COVID-19 vaccine but no booster which she declines.  \par \par Recheck a urine which was borderline last year.  She decided not to see a urologist.  \par \par She is doing well from an anxiety standpoint.

## 2023-04-18 NOTE — PHYSICAL EXAM
[No Acute Distress] : no acute distress [Well Nourished] : well nourished [Well Developed] : well developed [Well-Appearing] : well-appearing [Normal Sclera/Conjunctiva] : normal sclera/conjunctiva [PERRL] : pupils equal round and reactive to light [EOMI] : extraocular movements intact [Normal Outer Ear/Nose] : the outer ears and nose were normal in appearance [Normal Oropharynx] : the oropharynx was normal [No JVD] : no jugular venous distention [No Lymphadenopathy] : no lymphadenopathy [Supple] : supple [Thyroid Normal, No Nodules] : the thyroid was normal and there were no nodules present [No Respiratory Distress] : no respiratory distress  [No Accessory Muscle Use] : no accessory muscle use [Clear to Auscultation] : lungs were clear to auscultation bilaterally [Normal Rate] : normal rate  [Regular Rhythm] : with a regular rhythm [Normal S1, S2] : normal S1 and S2 [No Murmur] : no murmur heard [No Carotid Bruits] : no carotid bruits [No Abdominal Bruit] : a ~M bruit was not heard ~T in the abdomen [No Varicosities] : no varicosities [Pedal Pulses Present] : the pedal pulses are present [No Edema] : there was no peripheral edema [No Palpable Aorta] : no palpable aorta [No Extremity Clubbing/Cyanosis] : no extremity clubbing/cyanosis [Soft] : abdomen soft [Non Tender] : non-tender [Non-distended] : non-distended [No Masses] : no abdominal mass palpated [No HSM] : no HSM [Normal Bowel Sounds] : normal bowel sounds [Normal Posterior Cervical Nodes] : no posterior cervical lymphadenopathy [Normal Anterior Cervical Nodes] : no anterior cervical lymphadenopathy [No CVA Tenderness] : no CVA  tenderness [No Spinal Tenderness] : no spinal tenderness [No Joint Swelling] : no joint swelling [Grossly Normal Strength/Tone] : grossly normal strength/tone [No Rash] : no rash [Coordination Grossly Intact] : coordination grossly intact [No Focal Deficits] : no focal deficits [Normal Gait] : normal gait [Deep Tendon Reflexes (DTR)] : deep tendon reflexes were 2+ and symmetric [Normal Affect] : the affect was normal [Normal Insight/Judgement] : insight and judgment were intact [de-identified] : by gyn

## 2023-04-18 NOTE — HEALTH RISK ASSESSMENT
[No] : No [No falls in past year] : Patient reported no falls in the past year [0] : 2) Feeling down, depressed, or hopeless: Not at all (0) [Fully functional (using the telephone, shopping, preparing meals, housekeeping, doing laundry, using] : Fully functional and needs no help or supervision to perform IADLs (using the telephone, shopping, preparing meals, housekeeping, doing laundry, using transportation, managing medications and managing finances) [Fully functional (bathing, dressing, toileting, transferring, walking, feeding)] : Fully functional (bathing, dressing, toileting, transferring, walking, feeding) [[id number]] : 0 [Reports changes in hearing] : Reports no changes in hearing [Reports changes in vision] : Reports no changes in vision [Reports changes in dental health] : Reports no changes in dental health

## 2023-05-04 LAB
ALBUMIN SERPL ELPH-MCNC: 5 G/DL
ALP BLD-CCNC: 54 U/L
ALT SERPL-CCNC: 13 U/L
ANION GAP SERPL CALC-SCNC: 10 MMOL/L
APPEARANCE: CLEAR
AST SERPL-CCNC: 17 U/L
BACTERIA: NEGATIVE
BASOPHILS # BLD AUTO: 0.07 K/UL
BASOPHILS NFR BLD AUTO: 0.8 %
BILIRUB SERPL-MCNC: 0.3 MG/DL
BILIRUBIN URINE: NEGATIVE
BLOOD URINE: NEGATIVE
BUN SERPL-MCNC: 13 MG/DL
CALCIUM SERPL-MCNC: 9.6 MG/DL
CHLORIDE SERPL-SCNC: 102 MMOL/L
CHOLEST SERPL-MCNC: 201 MG/DL
CO2 SERPL-SCNC: 27 MMOL/L
COLOR: COLORLESS
CREAT SERPL-MCNC: 0.67 MG/DL
EGFR: 111 ML/MIN/1.73M2
EOSINOPHIL # BLD AUTO: 0.33 K/UL
EOSINOPHIL NFR BLD AUTO: 4 %
ESTIMATED AVERAGE GLUCOSE: 94 MG/DL
GLUCOSE QUALITATIVE U: NEGATIVE
GLUCOSE SERPL-MCNC: 75 MG/DL
HBA1C MFR BLD HPLC: 4.9 %
HCT VFR BLD CALC: 42.9 %
HDLC SERPL-MCNC: 95 MG/DL
HGB BLD-MCNC: 14 G/DL
HYALINE CASTS: 0 /LPF
IMM GRANULOCYTES NFR BLD AUTO: 0.2 %
KETONES URINE: NEGATIVE
LDLC SERPL CALC-MCNC: 96 MG/DL
LEUKOCYTE ESTERASE URINE: NEGATIVE
LYMPHOCYTES # BLD AUTO: 2.34 K/UL
LYMPHOCYTES NFR BLD AUTO: 28.4 %
MAN DIFF?: NORMAL
MCHC RBC-ENTMCNC: 30.2 PG
MCHC RBC-ENTMCNC: 32.6 GM/DL
MCV RBC AUTO: 92.5 FL
MICROSCOPIC-UA: NORMAL
MONOCYTES # BLD AUTO: 0.72 K/UL
MONOCYTES NFR BLD AUTO: 8.7 %
NEUTROPHILS # BLD AUTO: 4.76 K/UL
NEUTROPHILS NFR BLD AUTO: 57.9 %
NITRITE URINE: NEGATIVE
NONHDLC SERPL-MCNC: 107 MG/DL
PH URINE: 7.5
PLATELET # BLD AUTO: 210 K/UL
POTASSIUM SERPL-SCNC: 4 MMOL/L
PROT SERPL-MCNC: 7.3 G/DL
PROTEIN URINE: NEGATIVE
RBC # BLD: 4.64 M/UL
RBC # FLD: 13.6 %
RED BLOOD CELLS URINE: 1 /HPF
SODIUM SERPL-SCNC: 139 MMOL/L
SPECIFIC GRAVITY URINE: 1.01
SQUAMOUS EPITHELIAL CELLS: 0 /HPF
T4 FREE SERPL-MCNC: 0.9 NG/DL
TRIGL SERPL-MCNC: 53 MG/DL
TSH SERPL-ACNC: 2.25 UIU/ML
URINE CYTOLOGY: NORMAL
UROBILINOGEN URINE: NORMAL
WBC # FLD AUTO: 8.24 K/UL
WHITE BLOOD CELLS URINE: 0 /HPF

## 2023-07-16 ENCOUNTER — NON-APPOINTMENT (OUTPATIENT)
Age: 43
End: 2023-07-16

## 2023-08-11 ENCOUNTER — APPOINTMENT (OUTPATIENT)
Dept: INTERNAL MEDICINE | Facility: CLINIC | Age: 43
End: 2023-08-11

## 2023-10-23 ENCOUNTER — RX RENEWAL (OUTPATIENT)
Age: 43
End: 2023-10-23

## 2023-10-23 RX ORDER — SERTRALINE HYDROCHLORIDE 100 MG/1
100 TABLET, FILM COATED ORAL DAILY
Qty: 135 | Refills: 3 | Status: ACTIVE | COMMUNITY
Start: 2021-08-22 | End: 1900-01-01

## 2023-11-09 ENCOUNTER — RX RENEWAL (OUTPATIENT)
Age: 43
End: 2023-11-09

## 2024-03-29 ENCOUNTER — RX RENEWAL (OUTPATIENT)
Age: 44
End: 2024-03-29

## 2024-04-22 ENCOUNTER — APPOINTMENT (OUTPATIENT)
Dept: INTERNAL MEDICINE | Facility: CLINIC | Age: 44
End: 2024-04-22
Payer: COMMERCIAL

## 2024-04-22 VITALS
TEMPERATURE: 97.8 F | BODY MASS INDEX: 28.89 KG/M2 | OXYGEN SATURATION: 97 % | HEIGHT: 62 IN | HEART RATE: 89 BPM | WEIGHT: 157 LBS

## 2024-04-22 DIAGNOSIS — U07.1 COVID-19: ICD-10-CM

## 2024-04-22 DIAGNOSIS — R82.90 UNSPECIFIED ABNORMAL FINDINGS IN URINE: ICD-10-CM

## 2024-04-22 DIAGNOSIS — Z00.00 ENCOUNTER FOR GENERAL ADULT MEDICAL EXAMINATION W/OUT ABNORMAL FINDINGS: ICD-10-CM

## 2024-04-22 PROCEDURE — 99396 PREV VISIT EST AGE 40-64: CPT

## 2024-04-24 LAB
APPEARANCE: CLEAR
BACTERIA: NEGATIVE /HPF
BILIRUBIN URINE: NEGATIVE
BLOOD URINE: NEGATIVE
CAST: 0 /LPF
COLOR: YELLOW
EPITHELIAL CELLS: 0 /HPF
GLUCOSE QUALITATIVE U: NEGATIVE MG/DL
KETONES URINE: NEGATIVE MG/DL
LEUKOCYTE ESTERASE URINE: NEGATIVE
MICROSCOPIC-UA: NORMAL
NITRITE URINE: NEGATIVE
PH URINE: 7.5
PROTEIN URINE: NEGATIVE MG/DL
RED BLOOD CELLS URINE: 0 /HPF
SPECIFIC GRAVITY URINE: 1.02
UROBILINOGEN URINE: 0.2 MG/DL
WHITE BLOOD CELLS URINE: 0 /HPF

## 2024-04-28 VITALS — DIASTOLIC BLOOD PRESSURE: 70 MMHG | SYSTOLIC BLOOD PRESSURE: 110 MMHG

## 2024-04-28 NOTE — HEALTH RISK ASSESSMENT
[No falls in past year] : Patient reported no falls in the past year [0] : 2) Feeling down, depressed, or hopeless: Not at all (0) [[id number]] : 0 [Fully functional (bathing, dressing, toileting, transferring, walking, feeding)] : Fully functional (bathing, dressing, toileting, transferring, walking, feeding) [Fully functional (using the telephone, shopping, preparing meals, housekeeping, doing laundry, using] : Fully functional and needs no help or supervision to perform IADLs (using the telephone, shopping, preparing meals, housekeeping, doing laundry, using transportation, managing medications and managing finances) [Reports changes in hearing] : Reports no changes in hearing [Reports changes in vision] : Reports no changes in vision [Reports changes in dental health] : Reports no changes in dental health

## 2024-04-28 NOTE — HISTORY OF PRESENT ILLNESS
[FreeTextEntry1] : The patient is here for a routine visit.  [de-identified] : She is active and walks 3-4 days per week.  Her diet varies.    Migraines are controlled.

## 2024-04-28 NOTE — PHYSICAL EXAM
[No Acute Distress] : no acute distress [Well Nourished] : well nourished [Well Developed] : well developed [Normal Sclera/Conjunctiva] : normal sclera/conjunctiva [Well-Appearing] : well-appearing [PERRL] : pupils equal round and reactive to light [EOMI] : extraocular movements intact [Normal Outer Ear/Nose] : the outer ears and nose were normal in appearance [Normal Oropharynx] : the oropharynx was normal [No JVD] : no jugular venous distention [No Lymphadenopathy] : no lymphadenopathy [Supple] : supple [Thyroid Normal, No Nodules] : the thyroid was normal and there were no nodules present [No Respiratory Distress] : no respiratory distress  [No Accessory Muscle Use] : no accessory muscle use [Clear to Auscultation] : lungs were clear to auscultation bilaterally [Normal Rate] : normal rate  [Regular Rhythm] : with a regular rhythm [Normal S1, S2] : normal S1 and S2 [No Murmur] : no murmur heard [No Carotid Bruits] : no carotid bruits [No Varicosities] : no varicosities [No Abdominal Bruit] : a ~M bruit was not heard ~T in the abdomen [Pedal Pulses Present] : the pedal pulses are present [No Edema] : there was no peripheral edema [No Palpable Aorta] : no palpable aorta [No Extremity Clubbing/Cyanosis] : no extremity clubbing/cyanosis [Soft] : abdomen soft [Non Tender] : non-tender [Non-distended] : non-distended [No Masses] : no abdominal mass palpated [No HSM] : no HSM [Normal Bowel Sounds] : normal bowel sounds [Normal Posterior Cervical Nodes] : no posterior cervical lymphadenopathy [Normal Anterior Cervical Nodes] : no anterior cervical lymphadenopathy [No CVA Tenderness] : no CVA  tenderness [No Spinal Tenderness] : no spinal tenderness [No Joint Swelling] : no joint swelling [Grossly Normal Strength/Tone] : grossly normal strength/tone [No Rash] : no rash [Coordination Grossly Intact] : coordination grossly intact [No Focal Deficits] : no focal deficits [Deep Tendon Reflexes (DTR)] : deep tendon reflexes were 2+ and symmetric [Normal Gait] : normal gait [Normal Affect] : the affect was normal [Normal Insight/Judgement] : insight and judgment were intact

## 2024-04-28 NOTE — ASSESSMENT
[FreeTextEntry1] : Discussed diet and exercise.  She is doing well on Sertraline and she was counseled.  She would like to do blood tests later and she was given a script.    Colonoscopy advised in 2025.  She sees a gyn.  Mammogram and breast U/S 7/23.  She had the COVID-19 vaccine but no further boosters which she declines.  She sees a dermatologist.

## 2024-05-13 RX ORDER — ALPRAZOLAM 0.5 MG/1
0.5 TABLET ORAL TWICE DAILY
Qty: 60 | Refills: 0 | Status: ACTIVE | COMMUNITY
Start: 2019-04-23 | End: 1900-01-01

## 2024-06-17 RX ORDER — SUMATRIPTAN 100 MG/1
100 TABLET, FILM COATED ORAL
Qty: 27 | Refills: 1 | Status: ACTIVE | COMMUNITY
Start: 2018-02-02 | End: 1900-01-01

## 2024-07-14 ENCOUNTER — EMERGENCY (EMERGENCY)
Facility: HOSPITAL | Age: 44
LOS: 1 days | Discharge: HOME | End: 2024-07-14
Attending: STUDENT IN AN ORGANIZED HEALTH CARE EDUCATION/TRAINING PROGRAM | Admitting: STUDENT IN AN ORGANIZED HEALTH CARE EDUCATION/TRAINING PROGRAM
Payer: COMMERCIAL

## 2024-07-14 VITALS
SYSTOLIC BLOOD PRESSURE: 129 MMHG | HEART RATE: 92 BPM | TEMPERATURE: 98 F | DIASTOLIC BLOOD PRESSURE: 76 MMHG | RESPIRATION RATE: 18 BRPM | WEIGHT: 154.98 LBS | OXYGEN SATURATION: 98 % | HEIGHT: 62 IN

## 2024-07-14 VITALS
RESPIRATION RATE: 18 BRPM | HEART RATE: 65 BPM | SYSTOLIC BLOOD PRESSURE: 121 MMHG | TEMPERATURE: 98 F | OXYGEN SATURATION: 97 % | DIASTOLIC BLOOD PRESSURE: 75 MMHG

## 2024-07-14 PROCEDURE — 99284 EMERGENCY DEPT VISIT MOD MDM: CPT

## 2024-07-14 PROCEDURE — 99284 EMERGENCY DEPT VISIT MOD MDM: CPT | Mod: 25

## 2024-07-14 PROCEDURE — 71250 CT THORAX DX C-: CPT | Mod: 26,MC

## 2024-07-14 PROCEDURE — 71250 CT THORAX DX C-: CPT | Mod: MC

## 2024-07-14 RX ORDER — OXYCODONE HYDROCHLORIDE 100 MG/5ML
1 SOLUTION ORAL
Qty: 12 | Refills: 0
Start: 2024-07-14 | End: 2024-07-16

## 2024-07-14 RX ORDER — OXYCODONE AND ACETAMINOPHEN 5; 325 MG/1; MG/1
1 TABLET ORAL ONCE
Refills: 0 | Status: DISCONTINUED | OUTPATIENT
Start: 2024-07-14 | End: 2024-07-14

## 2024-07-14 RX ORDER — LIDOCAINE HCL 28 MG/G
1 GEL TOPICAL ONCE
Refills: 0 | Status: COMPLETED | OUTPATIENT
Start: 2024-07-14 | End: 2024-07-14

## 2024-07-14 RX ADMIN — OXYCODONE AND ACETAMINOPHEN 1 TABLET(S): 5; 325 TABLET ORAL at 15:30

## 2024-07-14 RX ADMIN — LIDOCAINE HCL 1 PATCH: 28 GEL TOPICAL at 15:49

## 2024-07-31 ENCOUNTER — RX RENEWAL (OUTPATIENT)
Age: 44
End: 2024-07-31

## 2024-08-06 ENCOUNTER — INPATIENT (INPATIENT)
Facility: HOSPITAL | Age: 44
LOS: 0 days | Discharge: HOME | DRG: 194 | End: 2024-08-07
Attending: INTERNAL MEDICINE | Admitting: INTERNAL MEDICINE
Payer: COMMERCIAL

## 2024-08-06 ENCOUNTER — NON-APPOINTMENT (OUTPATIENT)
Age: 44
End: 2024-08-06

## 2024-08-06 VITALS
OXYGEN SATURATION: 97 % | TEMPERATURE: 101 F | HEART RATE: 89 BPM | WEIGHT: 156.09 LBS | HEIGHT: 62 IN | RESPIRATION RATE: 18 BRPM | SYSTOLIC BLOOD PRESSURE: 119 MMHG | DIASTOLIC BLOOD PRESSURE: 79 MMHG

## 2024-08-06 LAB
ALBUMIN SERPL ELPH-MCNC: 3.3 G/DL — SIGNIFICANT CHANGE UP (ref 3.3–5)
ALP SERPL-CCNC: 64 U/L — SIGNIFICANT CHANGE UP (ref 40–120)
ALT FLD-CCNC: 23 U/L — SIGNIFICANT CHANGE UP (ref 12–78)
ANION GAP SERPL CALC-SCNC: 6 MMOL/L — SIGNIFICANT CHANGE UP (ref 5–17)
APTT BLD: 33.8 SEC — SIGNIFICANT CHANGE UP (ref 24.5–35.6)
AST SERPL-CCNC: 21 U/L — SIGNIFICANT CHANGE UP (ref 15–37)
BASOPHILS # BLD AUTO: 0.06 K/UL — SIGNIFICANT CHANGE UP (ref 0–0.2)
BASOPHILS NFR BLD AUTO: 0.9 % — SIGNIFICANT CHANGE UP (ref 0–2)
BILIRUB SERPL-MCNC: 0.3 MG/DL — SIGNIFICANT CHANGE UP (ref 0.2–1.2)
BUN SERPL-MCNC: 5 MG/DL — LOW (ref 7–23)
CALCIUM SERPL-MCNC: 8.9 MG/DL — SIGNIFICANT CHANGE UP (ref 8.5–10.1)
CHLORIDE SERPL-SCNC: 105 MMOL/L — SIGNIFICANT CHANGE UP (ref 96–108)
CO2 SERPL-SCNC: 28 MMOL/L — SIGNIFICANT CHANGE UP (ref 22–31)
CREAT SERPL-MCNC: 0.63 MG/DL — SIGNIFICANT CHANGE UP (ref 0.5–1.3)
EGFR: 112 ML/MIN/1.73M2 — SIGNIFICANT CHANGE UP
EOSINOPHIL # BLD AUTO: 0.35 K/UL — SIGNIFICANT CHANGE UP (ref 0–0.5)
EOSINOPHIL NFR BLD AUTO: 5.2 % — SIGNIFICANT CHANGE UP (ref 0–6)
FLUAV AG NPH QL: SIGNIFICANT CHANGE UP
FLUBV AG NPH QL: SIGNIFICANT CHANGE UP
GLUCOSE SERPL-MCNC: 92 MG/DL — SIGNIFICANT CHANGE UP (ref 70–99)
HCT VFR BLD CALC: 39.4 % — SIGNIFICANT CHANGE UP (ref 34.5–45)
HGB BLD-MCNC: 13.5 G/DL — SIGNIFICANT CHANGE UP (ref 11.5–15.5)
IMM GRANULOCYTES NFR BLD AUTO: 0.3 % — SIGNIFICANT CHANGE UP (ref 0–0.9)
INR BLD: 0.95 RATIO — SIGNIFICANT CHANGE UP (ref 0.85–1.18)
LACTATE SERPL-SCNC: 1 MMOL/L — SIGNIFICANT CHANGE UP (ref 0.7–2)
LYMPHOCYTES # BLD AUTO: 1.09 K/UL — SIGNIFICANT CHANGE UP (ref 1–3.3)
LYMPHOCYTES # BLD AUTO: 16.3 % — SIGNIFICANT CHANGE UP (ref 13–44)
MCHC RBC-ENTMCNC: 30.6 PG — SIGNIFICANT CHANGE UP (ref 27–34)
MCHC RBC-ENTMCNC: 34.3 GM/DL — SIGNIFICANT CHANGE UP (ref 32–36)
MCV RBC AUTO: 89.3 FL — SIGNIFICANT CHANGE UP (ref 80–100)
MONOCYTES # BLD AUTO: 0.72 K/UL — SIGNIFICANT CHANGE UP (ref 0–0.9)
MONOCYTES NFR BLD AUTO: 10.8 % — SIGNIFICANT CHANGE UP (ref 2–14)
NEUTROPHILS # BLD AUTO: 4.43 K/UL — SIGNIFICANT CHANGE UP (ref 1.8–7.4)
NEUTROPHILS NFR BLD AUTO: 66.5 % — SIGNIFICANT CHANGE UP (ref 43–77)
NRBC # BLD: 0 /100 WBCS — SIGNIFICANT CHANGE UP (ref 0–0)
NT-PROBNP SERPL-SCNC: 78 PG/ML — SIGNIFICANT CHANGE UP (ref 0–125)
PLATELET # BLD AUTO: 156 K/UL — SIGNIFICANT CHANGE UP (ref 150–400)
POTASSIUM SERPL-MCNC: 4.2 MMOL/L — SIGNIFICANT CHANGE UP (ref 3.5–5.3)
POTASSIUM SERPL-SCNC: 4.2 MMOL/L — SIGNIFICANT CHANGE UP (ref 3.5–5.3)
PROT SERPL-MCNC: 7.7 G/DL — SIGNIFICANT CHANGE UP (ref 6–8.3)
PROTHROM AB SERPL-ACNC: 11.1 SEC — SIGNIFICANT CHANGE UP (ref 9.5–13)
RBC # BLD: 4.41 M/UL — SIGNIFICANT CHANGE UP (ref 3.8–5.2)
RBC # FLD: 13.1 % — SIGNIFICANT CHANGE UP (ref 10.3–14.5)
RSV RNA NPH QL NAA+NON-PROBE: SIGNIFICANT CHANGE UP
SARS-COV-2 RNA SPEC QL NAA+PROBE: SIGNIFICANT CHANGE UP
SODIUM SERPL-SCNC: 139 MMOL/L — SIGNIFICANT CHANGE UP (ref 135–145)
WBC # BLD: 6.67 K/UL — SIGNIFICANT CHANGE UP (ref 3.8–10.5)
WBC # FLD AUTO: 6.67 K/UL — SIGNIFICANT CHANGE UP (ref 3.8–10.5)

## 2024-08-06 PROCEDURE — 71046 X-RAY EXAM CHEST 2 VIEWS: CPT | Mod: 26

## 2024-08-06 PROCEDURE — 99285 EMERGENCY DEPT VISIT HI MDM: CPT

## 2024-08-06 RX ORDER — IPRATROPIUM BROMIDE AND ALBUTEROL SULFATE 2.5; .5 MG/3ML; MG/3ML
3 SOLUTION RESPIRATORY (INHALATION) ONCE
Refills: 0 | Status: COMPLETED | OUTPATIENT
Start: 2024-08-06 | End: 2024-08-06

## 2024-08-06 RX ORDER — ACETAMINOPHEN 500 MG
1000 TABLET ORAL ONCE
Refills: 0 | Status: COMPLETED | OUTPATIENT
Start: 2024-08-06 | End: 2024-08-06

## 2024-08-06 RX ORDER — AZITHROMYCIN 250 MG
500 TABLET ORAL ONCE
Refills: 0 | Status: COMPLETED | OUTPATIENT
Start: 2024-08-06 | End: 2024-08-06

## 2024-08-06 RX ORDER — BACTERIOSTATIC SODIUM CHLORIDE 0.9 %
2200 VIAL (ML) INJECTION ONCE
Refills: 0 | Status: COMPLETED | OUTPATIENT
Start: 2024-08-06 | End: 2024-08-06

## 2024-08-06 RX ADMIN — Medication 100 MILLIGRAM(S): at 23:50

## 2024-08-06 RX ADMIN — Medication 400 MILLIGRAM(S): at 22:43

## 2024-08-06 RX ADMIN — IPRATROPIUM BROMIDE AND ALBUTEROL SULFATE 3 MILLILITER(S): 2.5; .5 SOLUTION RESPIRATORY (INHALATION) at 23:50

## 2024-08-06 RX ADMIN — Medication 2200 MILLILITER(S): at 22:43

## 2024-08-06 NOTE — ED ADULT NURSE NOTE - OBJECTIVE STATEMENT
patient comes in with complaints of cough, congestion, difficulty swallowing and fevers for the past 2 days. patient went to Urgent care gave Augmentin for bronchitis. Patient  was told to come to the ER to r/o PNA.

## 2024-08-06 NOTE — ED PROVIDER NOTE - RATE
- Perform wound care daily after shower/bath and whenever dressing becomes soiled or wet via removing dressing, cleansing with betadine or performing foot soak, patting area dry, applying antibiotic cream, and covering with bandaid.    - Apply white vinegar soaked cotton balls to affected toenails for 5 minutes daily or perform 25% white vinegar and 75% lukewarm water foot soaks for 10 minutes daily to reduce fungal debris under toenails.  Apply thin layer of Vicks vaporub on top of and under affected nails to create environment that doesn't support fungal growth.  Don't perform any of these antifungal therapies if open wounds are present.    - Notify clinic if any new or worsening condition arises.   74

## 2024-08-06 NOTE — ED ADULT TRIAGE NOTE - CHIEF COMPLAINT QUOTE
c/o cough, congestion, difficulty swallowing, fever. pt states she started symptoms 2 days ago. UC gave Augmentin for bronchitis, but pt continues with fevers, body aches, SOB. 97 % room air. Pt was told to come to the ER to r/o PNA.  no allergies to medicine. Pt last took 3 advil at 4:30PM. Pt did take ABT as perscribed. pt does use inhaler. med hx Chronic bronchitis, asthma,

## 2024-08-06 NOTE — ED PROVIDER NOTE - OBJECTIVE STATEMENT
43-year-old female with history of chronic bronchitis/asthma presents with complaint of cough, fever, body aches and shortness of breath x 3 days.  Patient states that she had sustained a fall on 7/13 and was seen in ED on 7/14 and diagnosed with a right 10th rib fracture, nondisplaced.  States that symptoms had improved after using incentive spirometer and pain medication.  States that her son had a cough last week.  States that she started having cough with fever, Tmax 101F and shortness of breath 3 days ago.  Took last dose of Advil at 4:30 PM today.  States that she was seen in urgent care 2 days ago and started on Augmentin (did not have chest x-ray or viral swab done) without improvement of symptoms.  States that she has also been using albuterol inhaler and Mucinex without relief.  States that she spoke to her PCP, Dr. Rainer Sun today and was advised to come to ER.  Denies recent travel, abdominal pain, chest pain, SOB, urinary symptoms or other symptoms.

## 2024-08-06 NOTE — ED ADULT NURSE NOTE - NSFALLRISKASMTTYPE_ED_ALL_ED
Initial (On Arrival) Split-Thickness Skin Graft Text: The defect edges were debeveled with a #15 scalpel blade.  Given the location of the defect, shape of the defect and the proximity to free margins a split thickness skin graft was deemed most appropriate.  Using a sterile surgical marker, the primary defect shape was transferred to the donor site. The split thickness graft was then harvested.  The skin graft was then placed in the primary defect and oriented appropriately.

## 2024-08-06 NOTE — H&P ADULT - PROBLEM SELECTOR PLAN 2
R/O sepsis  Pan-culture  F/U culture data  Continue iv Rocephin/Zithromax  Check procalcitonin level  ID consult  Further work-up/management pending clinical course.

## 2024-08-06 NOTE — ED PROVIDER NOTE - CLINICAL SUMMARY MEDICAL DECISION MAKING FREE TEXT BOX
43 year old female with a history of asthma and bronchitis p/w dry cough, SOB, congestion, fever, body aches x 3 days.  Seen at UC 2 days ago, dx with bronchitis and started on Augmentin with no relief.  Temp 101 in ED.  Check labs, cultures, lactate, CXR, EKG, nebs, abx, consider admission for failed outpatient treatment,

## 2024-08-06 NOTE — ED PROVIDER NOTE - ATTENDING APP SHARED VISIT CONTRIBUTION OF CARE
43-year-old female with a history of asthma, bronchitis presents with dry cough, subjective fever, body aches, shortness of breath x 3 days.  Patient went to urgent care 2 days ago, was diagnosed with bronchitis and was started on Augmentin.  She states she did not receive a CXR or a flu/COVID.  She has been taking the Augmentin with no relief.  In addition she has been taking Advil, Tylenol, NyQuil which has provided mild relief.  She last took Advil 600mg at 4:30 this afternoon.  In addition patient has been using her albuterol inhaler and Mucinex.  She denies chest pain, headache, rash, neck pain, sore throat.  She is tolerating p.o.  She spoke to her PMD today who advised her to come to the emergency room for further evaluation.  Patient was seen in the ED on 7/14 for a fall, dx with right 10th rib fracture. Patient stopped smoking 1 year ago. PMD Rainer Sun

## 2024-08-06 NOTE — ED PROVIDER NOTE - DIFFERENTIAL DIAGNOSIS
Ddx includes but not limited to PNA, asthma, RAD, bronchitis, flu, COVID, viral illness, sinusitis Differential Diagnosis

## 2024-08-06 NOTE — ED PROVIDER NOTE - MUSCULOSKELETAL, MLM
Spine appears normal, range of motion is not limited, no muscle or joint tenderness, no LE edema or tenderness noted

## 2024-08-06 NOTE — H&P ADULT - HISTORY OF PRESENT ILLNESS
This is a 43-year-old female with history of chronic bronchitis/asthma presents with complaint of cough, fever, body aches and shortness of breath x 3 days.  Patient states that she had sustained a fall on 7/13 and was seen in ED on 7/14 and diagnosed with a right 10th rib fracture, nondisplaced.  States that symptoms had improved after using incentive spirometer and pain medication.  States that her son had a cough last week.  States that she started having cough with fever, Tmax 101F and shortness of breath 3 days ago.  Took last dose of Advil at 4:30 PM today.  States that she was seen in urgent care 2 days ago and started on Augmentin (did not have chest x-ray or viral swab done) without improvement of symptoms.  States that she has also been using albuterol inhaler and Mucinex without relief.  States that she spoke to her PCP, Dr. Rainer Sun today and was advised to come to ER.  Denies recent travel, abdominal pain, chest pain, SOB, urinary symptoms or other symptoms.

## 2024-08-07 ENCOUNTER — NON-APPOINTMENT (OUTPATIENT)
Age: 44
End: 2024-08-07

## 2024-08-07 ENCOUNTER — TRANSCRIPTION ENCOUNTER (OUTPATIENT)
Age: 44
End: 2024-08-07

## 2024-08-07 VITALS
SYSTOLIC BLOOD PRESSURE: 110 MMHG | RESPIRATION RATE: 18 BRPM | HEART RATE: 85 BPM | TEMPERATURE: 100 F | OXYGEN SATURATION: 95 % | DIASTOLIC BLOOD PRESSURE: 72 MMHG

## 2024-08-07 DIAGNOSIS — S22.39XA FRACTURE OF ONE RIB, UNSPECIFIED SIDE, INITIAL ENCOUNTER FOR CLOSED FRACTURE: ICD-10-CM

## 2024-08-07 DIAGNOSIS — J18.9 PNEUMONIA, UNSPECIFIED ORGANISM: ICD-10-CM

## 2024-08-07 DIAGNOSIS — J45.909 UNSPECIFIED ASTHMA, UNCOMPLICATED: ICD-10-CM

## 2024-08-07 DIAGNOSIS — F41.9 ANXIETY DISORDER, UNSPECIFIED: ICD-10-CM

## 2024-08-07 DIAGNOSIS — Z98.891 HISTORY OF UTERINE SCAR FROM PREVIOUS SURGERY: Chronic | ICD-10-CM

## 2024-08-07 DIAGNOSIS — R50.9 FEVER, UNSPECIFIED: ICD-10-CM

## 2024-08-07 LAB
ANION GAP SERPL CALC-SCNC: 2 MMOL/L — LOW (ref 5–17)
APPEARANCE UR: CLEAR — SIGNIFICANT CHANGE UP
BASOPHILS # BLD AUTO: 0.05 K/UL — SIGNIFICANT CHANGE UP (ref 0–0.2)
BASOPHILS NFR BLD AUTO: 0.9 % — SIGNIFICANT CHANGE UP (ref 0–2)
BILIRUB UR-MCNC: NEGATIVE — SIGNIFICANT CHANGE UP
BUN SERPL-MCNC: 3 MG/DL — LOW (ref 7–23)
CALCIUM SERPL-MCNC: 8.6 MG/DL — SIGNIFICANT CHANGE UP (ref 8.5–10.1)
CHLORIDE SERPL-SCNC: 111 MMOL/L — HIGH (ref 96–108)
CO2 SERPL-SCNC: 29 MMOL/L — SIGNIFICANT CHANGE UP (ref 22–31)
COLOR SPEC: YELLOW — SIGNIFICANT CHANGE UP
CREAT SERPL-MCNC: 0.58 MG/DL — SIGNIFICANT CHANGE UP (ref 0.5–1.3)
CRP SERPL-MCNC: 96 MG/L — HIGH
DIFF PNL FLD: NEGATIVE — SIGNIFICANT CHANGE UP
EGFR: 114 ML/MIN/1.73M2 — SIGNIFICANT CHANGE UP
EOSINOPHIL # BLD AUTO: 0.4 K/UL — SIGNIFICANT CHANGE UP (ref 0–0.5)
EOSINOPHIL # BLD: 300 /UL — SIGNIFICANT CHANGE UP (ref 50–350)
EOSINOPHIL NFR BLD AUTO: 7.3 % — HIGH (ref 0–6)
ERYTHROCYTE [SEDIMENTATION RATE] IN BLOOD: 43 MM/HR — HIGH (ref 0–15)
GLUCOSE SERPL-MCNC: 83 MG/DL — SIGNIFICANT CHANGE UP (ref 70–99)
GLUCOSE UR QL: NEGATIVE MG/DL — SIGNIFICANT CHANGE UP
HCG UR QL: NEGATIVE — SIGNIFICANT CHANGE UP
HCT VFR BLD CALC: 38.7 % — SIGNIFICANT CHANGE UP (ref 34.5–45)
HGB BLD-MCNC: 12.7 G/DL — SIGNIFICANT CHANGE UP (ref 11.5–15.5)
IMM GRANULOCYTES NFR BLD AUTO: 0.4 % — SIGNIFICANT CHANGE UP (ref 0–0.9)
KETONES UR-MCNC: NEGATIVE MG/DL — SIGNIFICANT CHANGE UP
LEUKOCYTE ESTERASE UR-ACNC: NEGATIVE — SIGNIFICANT CHANGE UP
LYMPHOCYTES # BLD AUTO: 1.05 K/UL — SIGNIFICANT CHANGE UP (ref 1–3.3)
LYMPHOCYTES # BLD AUTO: 19.1 % — SIGNIFICANT CHANGE UP (ref 13–44)
MAGNESIUM SERPL-MCNC: 2.2 MG/DL — SIGNIFICANT CHANGE UP (ref 1.6–2.6)
MCHC RBC-ENTMCNC: 30.1 PG — SIGNIFICANT CHANGE UP (ref 27–34)
MCHC RBC-ENTMCNC: 32.8 GM/DL — SIGNIFICANT CHANGE UP (ref 32–36)
MCV RBC AUTO: 91.7 FL — SIGNIFICANT CHANGE UP (ref 80–100)
MONOCYTES # BLD AUTO: 0.67 K/UL — SIGNIFICANT CHANGE UP (ref 0–0.9)
MONOCYTES NFR BLD AUTO: 12.2 % — SIGNIFICANT CHANGE UP (ref 2–14)
NEUTROPHILS # BLD AUTO: 3.31 K/UL — SIGNIFICANT CHANGE UP (ref 1.8–7.4)
NEUTROPHILS NFR BLD AUTO: 60.1 % — SIGNIFICANT CHANGE UP (ref 43–77)
NITRITE UR-MCNC: NEGATIVE — SIGNIFICANT CHANGE UP
NRBC # BLD: 0 /100 WBCS — SIGNIFICANT CHANGE UP (ref 0–0)
NT-PROBNP SERPL-SCNC: 134 PG/ML — HIGH (ref 0–125)
PH UR: 6 — SIGNIFICANT CHANGE UP (ref 5–8)
PLATELET # BLD AUTO: 141 K/UL — LOW (ref 150–400)
POTASSIUM SERPL-MCNC: 4.2 MMOL/L — SIGNIFICANT CHANGE UP (ref 3.5–5.3)
POTASSIUM SERPL-SCNC: 4.2 MMOL/L — SIGNIFICANT CHANGE UP (ref 3.5–5.3)
PROCALCITONIN SERPL-MCNC: 0.03 NG/ML — SIGNIFICANT CHANGE UP
PROT UR-MCNC: NEGATIVE MG/DL — SIGNIFICANT CHANGE UP
RBC # BLD: 4.22 M/UL — SIGNIFICANT CHANGE UP (ref 3.8–5.2)
RBC # FLD: 13.2 % — SIGNIFICANT CHANGE UP (ref 10.3–14.5)
SODIUM SERPL-SCNC: 142 MMOL/L — SIGNIFICANT CHANGE UP (ref 135–145)
SP GR SPEC: 1 — LOW (ref 1–1.03)
UROBILINOGEN FLD QL: 0.2 MG/DL — SIGNIFICANT CHANGE UP (ref 0.2–1)
WBC # BLD: 5.5 K/UL — SIGNIFICANT CHANGE UP (ref 3.8–10.5)
WBC # FLD AUTO: 5.5 K/UL — SIGNIFICANT CHANGE UP (ref 3.8–10.5)

## 2024-08-07 PROCEDURE — 71046 X-RAY EXAM CHEST 2 VIEWS: CPT

## 2024-08-07 PROCEDURE — 80048 BASIC METABOLIC PNL TOTAL CA: CPT

## 2024-08-07 PROCEDURE — 83735 ASSAY OF MAGNESIUM: CPT

## 2024-08-07 PROCEDURE — 96375 TX/PRO/DX INJ NEW DRUG ADDON: CPT

## 2024-08-07 PROCEDURE — 94640 AIRWAY INHALATION TREATMENT: CPT

## 2024-08-07 PROCEDURE — 36415 COLL VENOUS BLD VENIPUNCTURE: CPT

## 2024-08-07 PROCEDURE — 83605 ASSAY OF LACTIC ACID: CPT

## 2024-08-07 PROCEDURE — 85610 PROTHROMBIN TIME: CPT

## 2024-08-07 PROCEDURE — 71250 CT THORAX DX C-: CPT | Mod: 26,MC

## 2024-08-07 PROCEDURE — 85048 AUTOMATED LEUKOCYTE COUNT: CPT

## 2024-08-07 PROCEDURE — 93010 ELECTROCARDIOGRAM REPORT: CPT

## 2024-08-07 PROCEDURE — 93005 ELECTROCARDIOGRAM TRACING: CPT

## 2024-08-07 PROCEDURE — 80053 COMPREHEN METABOLIC PANEL: CPT

## 2024-08-07 PROCEDURE — 86140 C-REACTIVE PROTEIN: CPT

## 2024-08-07 PROCEDURE — 87637 SARSCOV2&INF A&B&RSV AMP PRB: CPT

## 2024-08-07 PROCEDURE — 85025 COMPLETE CBC W/AUTO DIFF WBC: CPT

## 2024-08-07 PROCEDURE — 87040 BLOOD CULTURE FOR BACTERIA: CPT

## 2024-08-07 PROCEDURE — 83880 ASSAY OF NATRIURETIC PEPTIDE: CPT

## 2024-08-07 PROCEDURE — 99285 EMERGENCY DEPT VISIT HI MDM: CPT

## 2024-08-07 PROCEDURE — 71250 CT THORAX DX C-: CPT | Mod: MC

## 2024-08-07 PROCEDURE — 81025 URINE PREGNANCY TEST: CPT

## 2024-08-07 PROCEDURE — 85730 THROMBOPLASTIN TIME PARTIAL: CPT

## 2024-08-07 PROCEDURE — 81003 URINALYSIS AUTO W/O SCOPE: CPT

## 2024-08-07 PROCEDURE — 85652 RBC SED RATE AUTOMATED: CPT

## 2024-08-07 PROCEDURE — 84145 PROCALCITONIN (PCT): CPT

## 2024-08-07 PROCEDURE — 96374 THER/PROPH/DIAG INJ IV PUSH: CPT

## 2024-08-07 RX ORDER — SERTRALINE HYDROCHLORIDE 100 MG/1
1 TABLET, FILM COATED ORAL
Refills: 0 | DISCHARGE

## 2024-08-07 RX ORDER — SUMATRIPTAN SUCCINATE 6 MG/.5ML
1 INJECTION SUBCUTANEOUS
Refills: 0 | DISCHARGE

## 2024-08-07 RX ORDER — AZITHROMYCIN 250 MG
500 TABLET ORAL EVERY 24 HOURS
Refills: 0 | Status: DISCONTINUED | OUTPATIENT
Start: 2024-08-07 | End: 2024-08-07

## 2024-08-07 RX ORDER — LEVOFLOXACIN 25 MG/ML
1 SOLUTION ORAL
Qty: 3 | Refills: 0
Start: 2024-08-07 | End: 2024-08-09

## 2024-08-07 RX ORDER — ALPRAZOLAM 0.5 MG
1 TABLET ORAL
Refills: 0 | DISCHARGE

## 2024-08-07 RX ORDER — ALPRAZOLAM 0.5 MG
0.5 TABLET ORAL
Refills: 0 | Status: DISCONTINUED | OUTPATIENT
Start: 2024-08-07 | End: 2024-08-07

## 2024-08-07 RX ORDER — GUAIFENESIN 100 MG/5ML
600 SOLUTION ORAL EVERY 12 HOURS
Refills: 0 | Status: DISCONTINUED | OUTPATIENT
Start: 2024-08-07 | End: 2024-08-07

## 2024-08-07 RX ORDER — MAGNESIUM, ALUMINUM HYDROXIDE 200-225/5
30 SUSPENSION, ORAL (FINAL DOSE FORM) ORAL EVERY 4 HOURS
Refills: 0 | Status: DISCONTINUED | OUTPATIENT
Start: 2024-08-07 | End: 2024-08-07

## 2024-08-07 RX ORDER — GUAIFENESIN 100 MG/5ML
100 SOLUTION ORAL EVERY 6 HOURS
Refills: 0 | Status: DISCONTINUED | OUTPATIENT
Start: 2024-08-07 | End: 2024-08-07

## 2024-08-07 RX ORDER — IPRATROPIUM BROMIDE AND ALBUTEROL SULFATE 2.5; .5 MG/3ML; MG/3ML
3 SOLUTION RESPIRATORY (INHALATION) EVERY 6 HOURS
Refills: 0 | Status: DISCONTINUED | OUTPATIENT
Start: 2024-08-07 | End: 2024-08-07

## 2024-08-07 RX ORDER — SUMATRIPTAN SUCCINATE 6 MG/.5ML
100 INJECTION SUBCUTANEOUS DAILY
Refills: 0 | Status: DISCONTINUED | OUTPATIENT
Start: 2024-08-07 | End: 2024-08-07

## 2024-08-07 RX ORDER — BENZONATATE 100 MG/1
1 CAPSULE, LIQUID FILLED ORAL
Qty: 30 | Refills: 1
Start: 2024-08-07

## 2024-08-07 RX ORDER — ALBUTEROL 90 MCG
2 AEROSOL REFILL (GRAM) INHALATION
Refills: 0 | DISCHARGE

## 2024-08-07 RX ORDER — SERTRALINE HYDROCHLORIDE 100 MG/1
150 TABLET, FILM COATED ORAL DAILY
Refills: 0 | Status: DISCONTINUED | OUTPATIENT
Start: 2024-08-07 | End: 2024-08-07

## 2024-08-07 RX ORDER — BUDESONIDE AND FORMOTEROL FUMARATE DIHYDRATE 80; 4.5 UG/1; UG/1
2 AEROSOL RESPIRATORY (INHALATION)
Qty: 1 | Refills: 0
Start: 2024-08-07

## 2024-08-07 RX ORDER — ONDANSETRON HCL/PF 4 MG/2 ML
4 VIAL (ML) INJECTION EVERY 6 HOURS
Refills: 0 | Status: DISCONTINUED | OUTPATIENT
Start: 2024-08-07 | End: 2024-08-07

## 2024-08-07 RX ORDER — ACETAMINOPHEN 500 MG
650 TABLET ORAL EVERY 6 HOURS
Refills: 0 | Status: DISCONTINUED | OUTPATIENT
Start: 2024-08-07 | End: 2024-08-07

## 2024-08-07 RX ORDER — BUDESONIDE AND FORMOTEROL FUMARATE DIHYDRATE 80; 4.5 UG/1; UG/1
2 AEROSOL RESPIRATORY (INHALATION)
Refills: 0 | Status: DISCONTINUED | OUTPATIENT
Start: 2024-08-07 | End: 2024-08-07

## 2024-08-07 RX ORDER — MELATONIN 3 MG
3 TABLET ORAL AT BEDTIME
Refills: 0 | Status: DISCONTINUED | OUTPATIENT
Start: 2024-08-07 | End: 2024-08-07

## 2024-08-07 RX ORDER — BENZONATATE 100 MG/1
100 CAPSULE, LIQUID FILLED ORAL THREE TIMES A DAY
Refills: 0 | Status: DISCONTINUED | OUTPATIENT
Start: 2024-08-07 | End: 2024-08-07

## 2024-08-07 RX ORDER — GUAIFENESIN 100 MG/5ML
5 SOLUTION ORAL
Qty: 0 | Refills: 0 | DISCHARGE
Start: 2024-08-07

## 2024-08-07 RX ORDER — IPRATROPIUM BROMIDE AND ALBUTEROL SULFATE 2.5; .5 MG/3ML; MG/3ML
3 SOLUTION RESPIRATORY (INHALATION)
Qty: 50 | Refills: 0
Start: 2024-08-07

## 2024-08-07 RX ADMIN — BUDESONIDE AND FORMOTEROL FUMARATE DIHYDRATE 2 PUFF(S): 80; 4.5 AEROSOL RESPIRATORY (INHALATION) at 07:53

## 2024-08-07 RX ADMIN — SERTRALINE HYDROCHLORIDE 150 MILLIGRAM(S): 100 TABLET, FILM COATED ORAL at 12:27

## 2024-08-07 RX ADMIN — IPRATROPIUM BROMIDE AND ALBUTEROL SULFATE 3 MILLILITER(S): 2.5; .5 SOLUTION RESPIRATORY (INHALATION) at 07:53

## 2024-08-07 RX ADMIN — Medication 0.5 MILLIGRAM(S): at 01:54

## 2024-08-07 RX ADMIN — IPRATROPIUM BROMIDE AND ALBUTEROL SULFATE 3 MILLILITER(S): 2.5; .5 SOLUTION RESPIRATORY (INHALATION) at 02:03

## 2024-08-07 RX ADMIN — Medication 255 MILLIGRAM(S): at 00:30

## 2024-08-07 RX ADMIN — GUAIFENESIN 600 MILLIGRAM(S): 100 SOLUTION ORAL at 01:12

## 2024-08-07 RX ADMIN — BENZONATATE 100 MILLIGRAM(S): 100 CAPSULE, LIQUID FILLED ORAL at 07:53

## 2024-08-07 NOTE — DISCHARGE NOTE PROVIDER - PROVIDER TOKENS
PROVIDER:[TOKEN:[[id number]],FOLLOWUP:[1 week],ESTABLISHEDPATIENT:[T]],PROVIDER:[TOKEN:[[id number]],FOLLOWUP:[Routine]]

## 2024-08-07 NOTE — DISCHARGE NOTE PROVIDER - CARE PROVIDER_API CALL
Rainer Sun Maxwell  Internal Medicine  [address] [facility], [address]  [place] [address]  Phone: [phone]  Fax: [phone]  Established Patient  Follow Up Time: 1 week    Etienne Lezama  Thoracic Surgery  [address] [place] [id number]  Phone: [phone]  Fax: [phone]  Follow Up Time: Routine

## 2024-08-07 NOTE — DISCHARGE NOTE PROVIDER - NSDCMRMEDTOKEN_GEN_ALL_CORE_FT
benzonatate 100 mg oral capsule: 1 cap(s) orally 3 times a day as needed for  cough  budesonide-formoterol 80 mcg-4.5 mcg/inh inhalation aerosol: 2 puff(s) inhaled 2 times a day  guaiFENesin 100 mg/5 mL oral liquid: 5 milliliter(s) orally every 6 hours As needed Cough  Imitrex 100 mg oral tablet: 1 tab(s) orally once a day as needed for migraine  ipratropium-albuterol 0.5 mg-2.5 mg/3 mL inhalation solution: 3 milliliter(s) inhaled every 6 hours as needed for  shortness of breath and/or wheezing  levoFLOXacin 750 mg oral tablet: 1 tab(s) orally every 24 hours start on 8/8/24  sertraline 150 mg oral capsule: 1 cap(s) orally once a day  Xanax 0.5 mg oral tablet: 1 tab(s) orally 2 times a day as needed for  anxiety

## 2024-08-07 NOTE — CONSULT NOTE ADULT - SUBJECTIVE AND OBJECTIVE BOX
Date/Time Patient Seen:  		  Referring MD:   Data Reviewed	       Patient is a 43y old  Female who presents with a chief complaint of cough, SOB, fever (06 Aug 2024 22:53)      Subjective/HPI   This is a 43-year-old female with history of chronic bronchitis/asthma presents with complaint of cough, fever, body aches and shortness of breath x 3 days.  Patient states that she had sustained a fall on  and was seen in ED on  and diagnosed with a right 10th rib fracture, nondisplaced.  States that symptoms had improved after using incentive spirometer and pain medication.  States that her son had a cough last week.  States that she started having cough with fever, Tmax 101F and shortness of breath 3 days ago.  Took last dose of Advil at 4:30 PM today.  States that she was seen in urgent care 2 days ago and started on Augmentin (did not have chest x-ray or viral swab done) without improvement of symptoms.  States that she has also been using albuterol inhaler and Mucinex without relief.  States that she spoke to her PCP, Dr. Rainer Sun today and was advised to come to ER.  Denies recent travel, abdominal pain, chest pain, SOB, urinary symptoms or other symptoms.    PAST MEDICAL & SURGICAL HISTORY:  Bronchitis    Asthma    History of        Review of Systems:  · General Symptoms	fever; chills; malaise; fatigue; weakness  · Skin/Breast	negative  · Ophthalmologic	negative  · ENMT	negative  · Respiratory and Thorax Symptoms	wheezing  dyspnea  cough  pleuritic chest pain  sputum production  · Negative Cardiovascular Symptoms	no orthopnea; no peripheral edema  · Cardiovascular Symptoms	dyspnea on exertion  · Gastrointestinal	negative  · Genitourinary	negative  · Musculoskeletal	negative  · Neurological	negative  · Psychiatric	negative  · Hematology/Lymphatics	negative  · Endocrine	negative  · Allergic/Immunologic	negative      Allergies and Intolerances:        Allergies:  	No Known Drug Allergies:   	dust: Miscellaneous, Rhinitis, dogs, cats, seasonal    Home Medications:   * Incomplete Medication History as of 07-Aug-2024 00:48 documented in Structured Notes  · 	Xanax 0.5 mg oral tablet: Last Dose Taken:  , 1 tab(s) orally 2 times a day as needed for  anxiety  · 	Albuterol (Eqv-ProAir HFA) 90 mcg/inh inhalation aerosol: Last Dose Taken:  , 2 puff(s) inhaled  · 	sertraline 150 mg oral capsule: Last Dose Taken:  , 1 cap(s) orally once a day  · 	Imitrex 100 mg oral tablet: Last Dose Taken:  , 1 tab(s) orally once a day as needed for  heartburn    Patient History:    Past Medical, Past Surgical, and Family History:  PAST MEDICAL HISTORY:  Asthma     Bronchitis.     PAST SURGICAL HISTORY:  History of .     FAMILY HISTORY:  No pertinent family history in first degree relatives. No pertinent family history of: No pertinent family history in first degree relatives.     Social History:  · Substance use	No     Tobacco Screening:  · Core Measure Site	Yes  · Has the patient used tobacco in the past 30 days?	No    Risk Assessment:    Present on Admission:  Deep Venous Thrombosis	no  Pulmonary Embolus	no     HIV Screening:  · In accordance with [facility] law, we offer every patient who comes to our ED an HIV test. Would you like to be tested today?	Opt out     Hepatitis C Test Questions:  · In accordance with [facility] Law, we offer every patient a Hepatitis C test. Would you like to be tested today?	Opt out        Medication list         MEDICATIONS  (STANDING):  albuterol/ipratropium for Nebulization 3 milliLiter(s) Nebulizer every 6 hours  azithromycin  IVPB 500 milliGRAM(s) IV Intermittent every 24 hours  benzonatate 100 milliGRAM(s) Oral three times a day  cefTRIAXone   IVPB 1000 milliGRAM(s) IV Intermittent every 24 hours  guaiFENesin  milliGRAM(s) Oral every 12 hours  sertraline 150 milliGRAM(s) Oral daily    MEDICATIONS  (PRN):  acetaminophen     Tablet .. 650 milliGRAM(s) Oral every 6 hours PRN Temp greater or equal to 38C (100.4F), Mild Pain (1 - 3)  ALPRAZolam 0.5 milliGRAM(s) Oral two times a day PRN anxiety  aluminum hydroxide/magnesium hydroxide/simethicone Suspension 30 milliLiter(s) Oral every 4 hours PRN Dyspepsia  guaiFENesin Oral Liquid (Sugar-Free) 100 milliGRAM(s) Oral every 6 hours PRN Cough  melatonin 3 milliGRAM(s) Oral at bedtime PRN Insomnia  ondansetron Injectable 4 milliGRAM(s) IV Push every 6 hours PRN Nausea and/or Vomiting  SUMAtriptan 100 milliGRAM(s) Oral daily PRN Migraine         Vitals log        ICU Vital Signs Last 24 Hrs  T(C): 36.7 (07 Aug 2024 05:28), Max: 38.3 (06 Aug 2024 20:36)  T(F): 98 (07 Aug 2024 05:28), Max: 101 (06 Aug 2024 20:36)  HR: 79 (07 Aug 2024 05:28) (78 - 89)  BP: 110/66 (07 Aug 2024 05:28) (110/66 - 126/81)  BP(mean): --  ABP: --  ABP(mean): --  RR: 18 (07 Aug 2024 05:28) (18 - 18)  SpO2: 96% (07 Aug 2024 05:28) (96% - 97%)    O2 Parameters below as of 07 Aug 2024 01:00  Patient On (Oxygen Delivery Method): room air                 Input and Output:  I&O's Detail      Lab Data                        13.5   6.67  )-----------( 156      ( 06 Aug 2024 22:50 )             39.4     08-06    139  |  105  |  5<L>  ----------------------------<  92  4.2   |  28  |  0.63    Ca    8.9      06 Aug 2024 22:50  Mg     2.2     08-07    TPro  7.7  /  Alb  3.3  /  TBili  0.3  /  DBili  x   /  AST  21  /  ALT  23  /  AlkPhos  64  08-06            Review of Systems	  cough  weakness  congestion      Objective     Physical Examination    heart s1s2  lung dc BS  head nc  verbal  alert  cn grossly int  on RA  moves extr      Pertinent Lab findings & Imaging      Martin:  NO   Adequate UO     I&O's Detail           Discussed with:     Cultures:	        Radiology      ACC: [id number] EXAM:  CT CHEST   ORDERED BY: KARINA SMITH     PROCEDURE DATE:  2024          INTERPRETATION:  CLINICAL INFORMATION: Cough, fever.    COMPARISON: 2024.    CONTRAST/COMPLICATIONS:  IV Contrast: NONE  Oral Contrast: NONE  Complications: None reported at time of study completion    PROCEDURE:  CT of the Chest was performed.  Sagittal and coronal reformats were performed.    FINDINGS:    LUNGS AND LARGE AIRWAYS: Patent central airways. Left lower lobe lung   consolidation.  PLEURA: Trace left pleural effusion.  VESSELS: Main pulmonary artery is not dilated. Thoracic aorta is normal   in caliber.  HEART: Heart size is normal. No pericardial effusion.  MEDIASTINUM AND SIMONE: No lymphadenopathy.  CHEST WALL AND LOWER NECK: Within normal limits.  VISUALIZED UPPER ABDOMEN: Small hiatus hernia.  BONES: Mild degenerative changes of the spine. Right posterior 10th rib   fracture which is slightly more displaced than on prior exam.    IMPRESSION:  Left lower lobe lung consolidation compatible with pneumonia. Trace left   pleural effusion.    Right posterior 10th rib fracture which is slightly more displaced than   on prior exam from 2024.        --- End of Report ---            ZAMZAM HAILE DO; Attending Radiologist  This document has been electronically signed. Aug  7 2024  1:45AM                        
[facility] DIV[facility] of INFECTIOUS DISEASE  Rell Ochoa MD PhD, Nayana Watson MD, Latrice Alvarado MD, Maureen Gutierres MD, Chava Tamayo MD  and providing coverage with Robert Mendes MD  Providing Infectious Disease Consultations at [facility], [facility]'s    Office# [phone] to schedule follow up appointments  Answering Service for urgent calls or New Consults [phone]  Cell# to text for urgent issues Rell Ochoa [phone]     HPI:  43-year-old female with chronic bronchitis/asthma presents with complaint of cough, fever, body aches and shortness of breath x 3 days PTA.  Patient states that she had sustained a fall on  and was seen in ED on  and diagnosed with a right 10th rib fracture, nondisplaced.  States that symptoms had improved after using incentive spirometer and pain medication.  States that her son had a cough last week.  States that she started having cough with fever, Tmax 101F and shortness of breath 3 days ago. She was seen in urgent care 2 days ago and started on Augmentin (did not have chest x-ray or viral swab done) without improvement of symptoms.  States that she has also been using albuterol inhaler and Mucinex without relief.  States that she spoke to her PCP, Dr. Rainer Sun and was advised to come to ER.  Denies recent travel, abdominal pain, chest pain, SOB, urinary symptoms or other symptoms.       PAST MEDICAL & SURGICAL HISTORY:  Bronchitis      Asthma      History of           Antimicrobials  azithromycin  IVPB 500 milliGRAM(s) IV Intermittent every 24 hours  cefTRIAXone   IVPB 1000 milliGRAM(s) IV Intermittent every 24 hours      Immunological      Other  acetaminophen     Tablet .. 650 milliGRAM(s) Oral every 6 hours PRN  albuterol/ipratropium for Nebulization 3 milliLiter(s) Nebulizer every 6 hours  ALPRAZolam 0.5 milliGRAM(s) Oral two times a day PRN  aluminum hydroxide/magnesium hydroxide/simethicone Suspension 30 milliLiter(s) Oral every 4 hours PRN  benzonatate 100 milliGRAM(s) Oral three times a day  budesonide  80 MICROgram(s)/formoterol 4.5 MICROgram(s) Inhaler 2 Puff(s) Inhalation two times a day  guaiFENesin  milliGRAM(s) Oral every 12 hours  guaiFENesin Oral Liquid (Sugar-Free) 100 milliGRAM(s) Oral every 6 hours PRN  melatonin 3 milliGRAM(s) Oral at bedtime PRN  ondansetron Injectable 4 milliGRAM(s) IV Push every 6 hours PRN  sertraline 150 milliGRAM(s) Oral daily  SUMAtriptan 100 milliGRAM(s) Oral daily PRN      Allergies    No Known Drug Allergies  dust (Rhinitis)    Intolerances    SOCIAL HISTORY:  no toxic habits reported      FAMILY HISTORY:  No pertinent family history in first degree relatives        ROS:    EYES:  Negative  blurry vision or double vision  GASTROINTESTINAL:  Negative for nausea, vomiting, diarrhea  -otherwise negative except for subjective    Vital Signs Last 24 Hrs  T(C): 36.7 (07 Aug 2024 08:02), Max: 38.3 (06 Aug 2024 20:36)  T(F): 98.1 (07 Aug 2024 08:02), Max: 101 (06 Aug 2024 20:36)  HR: 81 (07 Aug 2024 08:02) (78 - 89)  BP: 117/79 (07 Aug 2024 08:02) (110/66 - 126/81)  BP(mean): 81 (07 Aug 2024 08:02) (81 - 81)  RR: 18 (07 Aug 2024 08:02) (18 - 18)  SpO2: 98% (07 Aug 2024 08:02) (96% - 98%)    Parameters below as of 07 Aug 2024 08:02  Patient On (Oxygen Delivery Method): room air        PE:  In no distress  HEENT:  NC, PERRL, sclerae anicteric, conjunctivae clear, EOMI.  Sinuses nontender, no nasal exudate.  No buccal or pharyngeal lesions, erythema or exudate  Neck:  Supple, no adenopathy  Lungs:  No accessory muscle use, bilaterally clear to auscultation except for few crackles LLL  Cor:  distant  Abd:  Symmetric, normoactive BS.  Soft, nontender, no masses, guarding or rebound.  Liver and spleen not enlarged  Extrem:  No cyanosis or edema  Skin:  No rashes.  Neuro: grossly intact  Musc: moving all limbs freely, no focal deficits        LABS:                        12.7   5.50  )-----------( 141      ( 07 Aug 2024 06:00 )             38.7       WBC Count: 5.50 K/uL (24 @ 06:00)  WBC Count: 6.67 K/uL (24 @ 22:50)          142  |  111<H>  |  3<L>  ----------------------------<  83  4.2   |  29  |  0.58    Ca    8.6      07 Aug 2024 06:00  Mg     2.2         TPro  7.7  /  Alb  3.3  /  TBili  0.3  /  DBili  x   /  AST  21  /  ALT  23  /  AlkPhos  64        Creatinine: 0.58 mg/dL (24 @ 06:00)  Creatinine: 0.63 mg/dL (24 @ 22:50)      Urinalysis Basic - ( 07 Aug 2024 06:00 )    Color: x / Appearance: x / SG: x / pH: x  Gluc: 83 mg/dL / Ketone: x  / Bili: x / Urobili: x   Blood: x / Protein: x / Nitrite: x   Leuk Esterase: x / RBC: x / WBC x   Sq Epi: x / Non Sq Epi: x / Bacteria: x              MICROBIOLOGY:      RADIOLOGY & ADDITIONAL STUDIES:    --< from: CT Chest No Cont (24 @ 01:11) >  ACC: [id number] EXAM:  CT CHEST   ORDERED BY: KARINA SMITH     PROCEDURE DATE:  2024          INTERPRETATION:  CLINICAL INFORMATION: Cough, fever.    COMPARISON: 2024.    CONTRAST/COMPLICATIONS:  IV Contrast: NONE  Oral Contrast: NONE  Complications: None reported at time of study completion    PROCEDURE:  CT of the Chest was performed.  Sagittal and coronal reformats were performed.    FINDINGS:    LUNGS AND LARGE AIRWAYS: Patent central airways. Left lower lobe lung   consolidation.  PLEURA: Trace left pleural effusion.  VESSELS: Main pulmonary artery is not dilated. Thoracic aorta is normal   in caliber.  HEART: Heart size is normal. No pericardial effusion.  MEDIASTINUM AND SIMONE: No lymphadenopathy.  CHEST WALL AND LOWER NECK: Within normal limits.  VISUALIZED UPPER ABDOMEN: Small hiatus hernia.  BONES: Mild degenerative changes of the spine. Right posterior 10th rib   fracture which is slightly more displaced than on prior exam.    IMPRESSION:  Left lower lobe lung consolidation compatible with pneumonia. Trace left   pleural effusion.    Right posterior 10th rib fracture which is slightly more displaced than   on prior exam from 2024.

## 2024-08-07 NOTE — CARE COORDINATION ASSESSMENT. - DOES THE PATIENT HAVE FINANCIAL RESOURCES TO MEET HEALTHCARE NEEDS SUCH AS PRESCRIPTIONS?
LVM regarding most recent lab results.     TSH wnl. Vitamin D also wnl but at the lower end of normal.    Advised Luz Maria continue to take Vitamin D3 5000 units daily until labs prior to next OV in 2021.      Analisa Aguirre PA-C  9/2/2020    
Yes

## 2024-08-07 NOTE — DISCHARGE NOTE NURSING/CASE MANAGEMENT/SOCIAL WORK - NSDCPEFALRISK_GEN_ALL_CORE
For information on Fall & Injury Prevention, visit: [email or web address] OR  [email or web address] OR  https://www.cdc.gov/steadi/patient.html

## 2024-08-07 NOTE — DISCHARGE NOTE NURSING/CASE MANAGEMENT/SOCIAL WORK - PATIENT PORTAL LINK FT
You can access the FollowMyHealth Patient Portal offered by [facility] by registering at the following website: [email or web address]. By joining [employer]’s FollowMyHealth portal, you will also be able to view your health information using other applications (apps) compatible with our system.

## 2024-08-07 NOTE — PROGRESS NOTE ADULT - SUBJECTIVE AND OBJECTIVE BOX
Date of Service: 08-07-24 @ 12:20    Patient is a 43y old  Female who presents with a chief complaint of cough, SOB, fever (07 Aug 2024 11:26)      INTERVAL HPI/OVERNIGHT EVENTS: Patient seen and examined. NAD. No complaints. Feeling better.    Vital Signs Last 24 Hrs  T(C): 36.7 (07 Aug 2024 08:02), Max: 38.3 (06 Aug 2024 20:36)  T(F): 98.1 (07 Aug 2024 08:02), Max: 101 (06 Aug 2024 20:36)  HR: 81 (07 Aug 2024 08:02) (78 - 89)  BP: 117/79 (07 Aug 2024 08:02) (110/66 - 126/81)  BP(mean): 81 (07 Aug 2024 08:02) (81 - 81)  RR: 18 (07 Aug 2024 08:02) (18 - 18)  SpO2: 98% (07 Aug 2024 08:02) (96% - 98%)    Parameters below as of 07 Aug 2024 08:02  Patient On (Oxygen Delivery Method): room air        08-07    142  |  111<H>  |  3<L>  ----------------------------<  83  4.2   |  29  |  0.58    Ca    8.6      07 Aug 2024 06:00  Mg     2.2     08-07    TPro  7.7  /  Alb  3.3  /  TBili  0.3  /  DBili  x   /  AST  21  /  ALT  23  /  AlkPhos  64  08-06                          12.7   5.50  )-----------( 141      ( 07 Aug 2024 06:00 )             38.7     PT/INR - ( 06 Aug 2024 22:50 )   PT: 11.1 sec;   INR: 0.95 ratio         PTT - ( 06 Aug 2024 22:50 )  PTT:33.8 sec  CAPILLARY BLOOD GLUCOSE        Urinalysis Basic - ( 07 Aug 2024 06:00 )    Color: x / Appearance: x / SG: x / pH: x  Gluc: 83 mg/dL / Ketone: x  / Bili: x / Urobili: x   Blood: x / Protein: x / Nitrite: x   Leuk Esterase: x / RBC: x / WBC x   Sq Epi: x / Non Sq Epi: x / Bacteria: x              acetaminophen     Tablet .. 650 milliGRAM(s) Oral every 6 hours PRN  albuterol/ipratropium for Nebulization 3 milliLiter(s) Nebulizer every 6 hours  ALPRAZolam 0.5 milliGRAM(s) Oral two times a day PRN  aluminum hydroxide/magnesium hydroxide/simethicone Suspension 30 milliLiter(s) Oral every 4 hours PRN  benzonatate 100 milliGRAM(s) Oral three times a day  budesonide  80 MICROgram(s)/formoterol 4.5 MICROgram(s) Inhaler 2 Puff(s) Inhalation two times a day  guaiFENesin  milliGRAM(s) Oral every 12 hours  guaiFENesin Oral Liquid (Sugar-Free) 100 milliGRAM(s) Oral every 6 hours PRN  levoFLOXacin  Tablet 750 milliGRAM(s) Oral every 24 hours  melatonin 3 milliGRAM(s) Oral at bedtime PRN  ondansetron Injectable 4 milliGRAM(s) IV Push every 6 hours PRN  sertraline 150 milliGRAM(s) Oral daily  SUMAtriptan 100 milliGRAM(s) Oral daily PRN      Vital Signs Last 24 Hrs  T(C): 36.7 (07 Aug 2024 08:02), Max: 38.3 (06 Aug 2024 20:36)  T(F): 98.1 (07 Aug 2024 08:02), Max: 101 (06 Aug 2024 20:36)  HR: 81 (07 Aug 2024 08:02) (78 - 89)  BP: 117/79 (07 Aug 2024 08:02) (110/66 - 126/81)  BP(mean): 81 (07 Aug 2024 08:02) (81 - 81)  RR: 18 (07 Aug 2024 08:02) (18 - 18)  SpO2: 98% (07 Aug 2024 08:02) (96% - 98%)    Parameters below as of 07 Aug 2024 08:02  Patient On (Oxygen Delivery Method): room air        08-07    142  |  111<H>  |  3<L>  ----------------------------<  83  4.2   |  29  |  0.58    Ca    8.6      07 Aug 2024 06:00  Mg     2.2     08-07    TPro  7.7  /  Alb  3.3  /  TBili  0.3  /  DBili  x   /  AST  21  /  ALT  23  /  AlkPhos  64  08-06                          12.7   5.50  )-----------( 141      ( 07 Aug 2024 06:00 )             38.7     PT/INR - ( 06 Aug 2024 22:50 )   PT: 11.1 sec;   INR: 0.95 ratio         PTT - ( 06 Aug 2024 22:50 )  PTT:33.8 sec  CAPILLARY BLOOD GLUCOSE        Urinalysis Basic - ( 07 Aug 2024 06:00 )    Color: x / Appearance: x / SG: x / pH: x  Gluc: 83 mg/dL / Ketone: x  / Bili: x / Urobili: x   Blood: x / Protein: x / Nitrite: x   Leuk Esterase: x / RBC: x / WBC x   Sq Epi: x / Non Sq Epi: x / Bacteria: x              acetaminophen     Tablet .. 650 milliGRAM(s) Oral every 6 hours PRN  albuterol/ipratropium for Nebulization 3 milliLiter(s) Nebulizer every 6 hours  ALPRAZolam 0.5 milliGRAM(s) Oral two times a day PRN  aluminum hydroxide/magnesium hydroxide/simethicone Suspension 30 milliLiter(s) Oral every 4 hours PRN  benzonatate 100 milliGRAM(s) Oral three times a day  budesonide  80 MICROgram(s)/formoterol 4.5 MICROgram(s) Inhaler 2 Puff(s) Inhalation two times a day  guaiFENesin  milliGRAM(s) Oral every 12 hours  guaiFENesin Oral Liquid (Sugar-Free) 100 milliGRAM(s) Oral every 6 hours PRN  levoFLOXacin  Tablet 750 milliGRAM(s) Oral every 24 hours  melatonin 3 milliGRAM(s) Oral at bedtime PRN  ondansetron Injectable 4 milliGRAM(s) IV Push every 6 hours PRN  sertraline 150 milliGRAM(s) Oral daily  SUMAtriptan 100 milliGRAM(s) Oral daily PRN              REVIEW OF SYSTEMS:  CONSTITUTIONAL: No fever, no weight loss, or no fatigue  NECK: No pain, no stiffness  RESPIRATORY: + cough, no wheezing, no chills, no hemoptysis, improved shortness of breath  CARDIOVASCULAR: No chest pain, no palpitations, no dizziness, no leg swelling  GASTROINTESTINAL: No abdominal pain. No nausea, no vomiting, no hematemesis; No diarrhea, no constipation. No melena, no hematochezia.  GENITOURINARY: No dysuria, no frequency, no hematuria, no incontinence  NEUROLOGICAL: No headaches, no loss of strength, no numbness, no tremors  SKIN: No itching, no burning  MUSCULOSKELETAL: No joint pain, no swelling; No muscle, no back, no extremity pain  PSYCHIATRIC: No depression, no mood swings,   HEME/LYMPH: No easy bruising, no bleeding gums  ALLERY AND IMMUNOLOGIC: No hives       Consultant(s) Notes Reviewed:  [X] YES  [ ] NO    PHYSICAL EXAM:  GENERAL: NAD  HEAD:  Atraumatic, Normocephalic  EYES: EOMI, PERRLA, conjunctiva and sclera clear  ENMT: No tonsillar erythema, exudates, or enlargement; Moist mucous membranes  NECK: Supple, No JVD  NERVOUS SYSTEM:  Awake & alert  CHEST/LUNG: Clear to auscultation bilaterally; No rales, rhonchi, wheezing,  HEART: Regular rate and rhythm  ABDOMEN: Soft, Nontender, Nondistended; Bowel sounds present  EXTREMITIES:  No clubbing, cyanosis, or edema  LYMPH: No lymphadenopathy noted  SKIN: No rashes      Advanced care planning discussed with patient/family [X] YES   [ ] NO    Advanced care planning discussed with patient/family. Patient's health status was discussed. All appropriate changes have been made regarding patient's end-of-life care. Advanced care planning forms reviewed/discussed/completed.  20 minutes spent.

## 2024-08-07 NOTE — DISCHARGE NOTE PROVIDER - CARE PROVIDERS DIRECT ADDRESSES
[email or web address]

## 2024-08-07 NOTE — PROGRESS NOTE ADULT - PROBLEM SELECTOR PLAN 1
Slowly improving   Seen by ID and pulmonary  Change to Levaquin for 4 more doses  Cleared by ID and pulm for d/c home

## 2024-08-07 NOTE — CARE COORDINATION ASSESSMENT. - OTHER PERTINENT REFERRAL INFORMATION
Sw met with Pt at bedside and is A & O x4. Sw introduced self and role to Pt. PT lives in a private home w spouse and 2 diane. There are 4 TOI and a full flight inside. The pt is indep at home w/ all her ADL's. No hx of DAX, DME and HC. PCP Dr. Rainer Sun [phone] [place]: [facility] [phone]

## 2024-08-07 NOTE — ED ADULT NURSE REASSESSMENT NOTE - NS ED NURSE REASSESS COMMENT FT1
Pt resting comfortably, provided w/ diet ginger ale per request. Comfortable in bed, all questions answered. Call bell within reach. Safety maintained, nursing care ongoing.

## 2024-08-07 NOTE — CONSULT NOTE ADULT - ASSESSMENT
43-year-old female with chronic bronchitis/asthma presents with complaint of cough, fever, body aches and shortness of breath x 3 days PTA. Sustained a fall on 7/13 and was seen in ED on 7/14 and diagnosed with a right 10th rib fracture, nondisplaced.  Son had a cough last week.  Fever, Tmax 101F and shortness of breath 3 days. Seen in urgent care Sunday and started on Augmentin (did not have chest x-ray or viral swab done) without improvement of symptoms.    CT-Left lower lobe lung consolidation compatible with pneumonia. Trace left pleural effusion.    RECOMMENDATIONS  LLL Community Acquired PNA  reported cough, fever, risk factor may be recent rib fracture but this was on the right side, no leukocytosis, pt looks well, noted abn exam and imaging evidence of airspace disease, pt started on ATS guideline Rx Azithro and ceftriaxone but reporting she really would like outpt options  Blood cultures collected late 8/6  will change abx to Levaquin 750mg PO daily with last day 8/10    Thank you for consulting us and involving us in the management of this most interesting and challenging case.  We will follow along in the care of this patient. Please call us at [phone] or text me directly on my cell# at [phone] with any concerns.    
43-year-old female with history of chronic bronchitis/asthma presents with complaint of cough, fever, body aches and shortness of breath x 3 days.  Patient states that she had sustained a fall on 7/13 and was seen in ED on 7/14 and diagnosed with a right 10th rib fracture, nondisplaced.  States that symptoms had improved after using incentive spirometer and pain medication.     pna  consolidation  left pna  asthma  bronchitis  right rib fx  pain  fall hx    thoracic cx - Dr Lezama - Rib Fx  I samantha  pain rx regimen  dvt p  Cough rx regimen  5 days min of ABX therapy for PNA - left PNA - CT chest reviewed  Symbicort - NEBS for Asthma - Asthmatic Bronchitis -   Pulm Eval as outpatient - PFT for baseline Pulm function  monitor VS and Sat  goal sat > 90 pct  mobilize as tolerated  biomarkers

## 2024-08-07 NOTE — DISCHARGE NOTE PROVIDER - HOSPITAL COURSE
This is a 43-year-old female with history of chronic bronchitis/asthma presents with complaint of cough, fever, body aches and shortness of breath x 3 days.  Patient states that she had sustained a fall on 7/13 and was seen in ED on 7/14 and diagnosed with a right 10th rib fracture, nondisplaced.  States that symptoms had improved after using incentive spirometer and pain medication.  States that her son had a cough last week.  States that she started having cough with fever, Tmax 101F and shortness of breath 3 days ago.  Took last dose of Advil at 4:30 PM today.  States that she was seen in urgent care 2 days ago and started on Augmentin (did not have chest x-ray or viral swab done) without improvement of symptoms.  States that she has also been using albuterol inhaler and Mucinex without relief.  States that she spoke to her PCP, Dr. Rainer Sun today and was advised to come to ER.  Denies recent travel, abdominal pain, chest pain, SOB, urinary symptoms or other symptoms.    CT LLL PNA/consolidation, slightly more displaced R rib fx  Patient treated with iv abx, nebs, with rapid improvement  Labs normal  Cleared by ID and pulm for d/c home    >35 minutes spent on discharge

## 2024-08-07 NOTE — CARE COORDINATION ASSESSMENT. - NSCAREPROVIDERS_GEN_ALL_CORE_FT
CARE PROVIDERS:  Accepting Physician: Jose Daniel Viveros  Access Services: Katty Junior  Administration: Pérez Weston  Administration: Michael Long  Admitting: Jose Daniel Viveros  Attending: Jose Daniel Viveros  Consultant: Rell Ochoa  Consultant: Blas Drummond  Covering Team: Jose Daniel Viveros  ED ACP: Autumn Chavez  ED Attending: Marry Crook  ED Nurse: Valerie Pickard  Nurse: Man Felix  Ordered: ADM, User  Ordered: ServiceAccount, SCMMLM  PCA/Nursing Assistant: Jessica Tsang  Primary Team: Suzie Gauthier  : Maddison Hansen  UR// Supp. Assoc.: Angelia Gracia// Supp. Assoc.: Suzanne Adame

## 2024-08-09 ENCOUNTER — APPOINTMENT (OUTPATIENT)
Dept: INTERNAL MEDICINE | Facility: CLINIC | Age: 44
End: 2024-08-09

## 2024-08-09 PROBLEM — J18.9 PNEUMONIA: Status: ACTIVE | Noted: 2024-08-08

## 2024-08-09 PROBLEM — J40 BRONCHITIS, NOT SPECIFIED AS ACUTE OR CHRONIC: Chronic | Status: ACTIVE | Noted: 2024-08-06

## 2024-08-09 PROBLEM — J45.909 UNSPECIFIED ASTHMA, UNCOMPLICATED: Chronic | Status: ACTIVE | Noted: 2024-08-06

## 2024-08-09 PROCEDURE — 99213 OFFICE O/P EST LOW 20 MIN: CPT

## 2024-08-09 NOTE — HISTORY OF PRESENT ILLNESS
[de-identified] : The patient is here to follow-up regarding the pneumonia.  She feels a little better.  She still has chest pain with coughing.  No fever or chills today.  Breathing is ok.  No sinus pain or sore throat.  Appetite is good.

## 2024-08-09 NOTE — ASSESSMENT
[FreeTextEntry1] : The patient has a left lung pneumonia, likely due to the previous rib fracture.  She is feeling a little better.  No fever or dyspnea.  She finds the inhaler and nebulizer helpful and she can continue it.  She is on Augmentin and Doxycycline. No rash.  Call PRN for any worse symptoms.

## 2024-08-12 LAB
CULTURE RESULTS: SIGNIFICANT CHANGE UP
CULTURE RESULTS: SIGNIFICANT CHANGE UP
SPECIMEN SOURCE: SIGNIFICANT CHANGE UP
SPECIMEN SOURCE: SIGNIFICANT CHANGE UP

## 2024-08-21 ENCOUNTER — APPOINTMENT (OUTPATIENT)
Dept: RADIOLOGY | Facility: CLINIC | Age: 44
End: 2024-08-21

## 2024-08-21 PROCEDURE — 71046 X-RAY EXAM CHEST 2 VIEWS: CPT

## 2024-08-27 ENCOUNTER — NON-APPOINTMENT (OUTPATIENT)
Age: 44
End: 2024-08-27

## 2024-10-14 ENCOUNTER — RX RENEWAL (OUTPATIENT)
Age: 44
End: 2024-10-14

## 2024-10-21 ENCOUNTER — NON-APPOINTMENT (OUTPATIENT)
Age: 44
End: 2024-10-21

## 2024-10-22 ENCOUNTER — EMERGENCY (EMERGENCY)
Facility: HOSPITAL | Age: 44
LOS: 1 days | Discharge: HOME | End: 2024-10-22
Attending: EMERGENCY MEDICINE | Admitting: EMERGENCY MEDICINE
Payer: COMMERCIAL

## 2024-10-22 VITALS
SYSTOLIC BLOOD PRESSURE: 109 MMHG | WEIGHT: 158.07 LBS | HEART RATE: 84 BPM | OXYGEN SATURATION: 97 % | DIASTOLIC BLOOD PRESSURE: 70 MMHG | TEMPERATURE: 98 F | HEIGHT: 62 IN | RESPIRATION RATE: 19 BRPM

## 2024-10-22 VITALS
TEMPERATURE: 98 F | DIASTOLIC BLOOD PRESSURE: 72 MMHG | HEART RATE: 57 BPM | SYSTOLIC BLOOD PRESSURE: 114 MMHG | OXYGEN SATURATION: 100 % | RESPIRATION RATE: 18 BRPM

## 2024-10-22 DIAGNOSIS — Z98.891 HISTORY OF UTERINE SCAR FROM PREVIOUS SURGERY: Chronic | ICD-10-CM

## 2024-10-22 PROCEDURE — 99284 EMERGENCY DEPT VISIT MOD MDM: CPT | Mod: 25

## 2024-10-22 PROCEDURE — 70450 CT HEAD/BRAIN W/O DYE: CPT | Mod: 26,MC

## 2024-10-22 PROCEDURE — 70450 CT HEAD/BRAIN W/O DYE: CPT | Mod: MC

## 2024-10-22 PROCEDURE — 99284 EMERGENCY DEPT VISIT MOD MDM: CPT

## 2024-10-22 RX ADMIN — Medication 600 MILLIGRAM(S): at 10:30
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
## 2024-10-22 NOTE — ED PROVIDER NOTE - PROVIDER TOKENS
PROVIDER:[TOKEN:[[id number]],FOLLOWUP:[1-3 Days],ESTABLISHEDPATIENT:[T]],PROVIDER:[TOKEN:[[id number]],FOLLOWUP:[4-6 Days]]
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
## 2024-10-22 NOTE — ED ADULT NURSE NOTE - NS ED NURSE LEVEL OF CONSCIOUSNESS MENTAL STATUS
Awake/Alert Fluconazole Counseling:  Patient counseled regarding adverse effects of fluconazole including but not limited to headache, diarrhea, nausea, upset stomach, liver function test abnormalities, taste disturbance, and stomach pain.  There is a rare possibility of liver failure that can occur when taking fluconazole.  The patient understands that monitoring of LFTs and kidney function test may be required, especially at baseline. The patient verbalized understanding of the proper use and possible adverse effects of fluconazole.  All of the patient's questions and concerns were addressed.

## 2024-10-22 NOTE — ED PROVIDER NOTE - PHYSICAL EXAMINATION
Gen: Awake, Alert, WD, WN, NAD  Head:  NC/AT  Eyes:  PERRL, EOMI, Conjunctiva pink, lids normal, no scleral icterus  ENT: OP clear, moist mucus membranes  Neck: supple, nontender, trachea midline  Back:  no CVAT, no MLT, + reproducble lumbar paraspinal muscle tenderness chronic as per pt  Ext:  warm, well perfused, moving all extremities spontaneously, no cyanosis, no erythema, no edema, distal pulses intact  Skin: intact, no rash, no vesicles, no petechiae, no ecchymosis  Neuro:  AAOx3, sensation intact, motor 5/5 x 4 extremities, normal gait, FTNT normal, neg romberg, speech clear

## 2024-10-22 NOTE — ED PROVIDER NOTE - PATIENT PORTAL LINK FT
You can access the FollowMyHealth Patient Portal offered by [facility] by registering at the following website: [email or web address]. By joining [employer]’s FollowMyHealth portal, you will also be able to view your health information using other applications (apps) compatible with our system.

## 2024-10-22 NOTE — ED PROVIDER NOTE - CARE PROVIDER_API CALL
Rainer Sun  Internal Medicine  [address], [address]  [place] [address]  Phone: [phone]  Fax: [phone]  Established Patient  Follow Up Time: 1-3 Days    Erika Yao  Neurology  [address], [address]  [place] [address]  Phone: [phone]  Fax: [phone]  Follow Up Time: 4-6 Days
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
## 2024-10-22 NOTE — ED PROVIDER NOTE - NSFOLLOWUPINSTRUCTIONS_ED_ALL_ED_FT
[employer] CareNotes®  :  [facility]        CONCUSSION - AfterCare(R) Instructions(ER/ED)    Concussion    WHAT YOU NEED TO KNOW:    A concussion is a mild brain injury. It is usually caused by a bump or blow to the head from a fall, a motor vehicle crash, or a sports injury. Being shaken forcefully may cause a concussion.    DISCHARGE INSTRUCTIONS:    Call your local emergency number (911 in the [place]), or have someone call if:    You cannot be woken.    You have a seizure, increasing confusion, or a change in personality.    Your speech becomes slurred.  Return to the emergency department if:    You have sudden or new vision problems.    One of your pupils is bigger than the other.    You have a severe headache that does not go away.    You have arm or leg weakness, numbness, or new problems with coordination.    You have blood or clear fluid coming out of the ears or nose.    You cannot stop vomiting.  Call your doctor if:    You have nausea or are vomiting.    You feel more sleepy than usual.    Your symptoms get worse.    Your symptoms last longer than 6 weeks.    You have questions or concerns about your condition or care.  Medicines: You may need any of the following:    Anti-nausea medicine may be given to treat nausea and vomiting.    Acetaminophen decreases pain and fever. It is available without a doctor's order. Ask how much to take and how often to take it. Follow directions. Read the labels of all other medicines you are using to see if they also contain acetaminophen, or ask your doctor or pharmacist. Acetaminophen can cause liver damage if not taken correctly.    Take your medicine as directed. Contact your healthcare provider if you think your medicine is not helping or if you have side effects. Tell your provider if you are allergic to any medicine. Keep a list of the medicines, vitamins, and herbs you take. Include the amounts, and when and why you take them. Bring the list or the pill bottles to follow-up visits. Carry your medicine list with you in case of an emergency.  Self-care: Concussion symptoms usually go away within 10 days, but they may last longer. The following may be recommended to manage your symptoms:    Rest from physical and mental activities as directed. Mental activities are those that require thinking, concentration, and attention. You will need to rest until your symptoms are gone. Rest will allow you to recover from your concussion. Ask your healthcare provider when you can return to work and other daily activities.    Have someone stay with you for the first 24 hours after your injury. Your healthcare provider should be contacted if your symptoms get worse, or you develop new symptoms.    Do not participate in sports and physical activities until your healthcare provider says it is okay. These can make your symptoms worse or lead to another concussion. Your healthcare provider will tell you when it is okay for you to return to sports or physical activities. Ask for more information about sports concussions.    Do not use heavy machinery or drive for 24 hours after your injury, or as directed. This can be dangerous and cause a serious accident. Your healthcare provider will tell you when it is safe for you to return to these activities.  Prevent another concussion:    Wear protective sports equipment that fits properly. Helmets help lower your risk for a serious brain injury. Talk to your healthcare provider about ways you can decrease your risk for a concussion if you play sports.    Wear your seatbelt every time you travel. This helps lower your risk for a head injury if you are in a car accident.  Follow up with your doctor as directed: Write down your questions so you remember to ask them during your visits.    For more information:    [employer]  [address] [id number]  Phone: [phone]  Phone: [phone]  Web Address: [email or web address]  © [employer] 1973, 2024    	  Follow up with your personal physician this week.  Stay well hydrated.  Advil 2-3 tablets (400-600mg) every 8 hours with food as needed.  Tylenol 1-2 tablets every 6 hours as needed.  Avoid using screens, reading, watching TV/computer.  Please return to the Emergency Department immediately for any problems or concerns.

## 2024-10-22 NOTE — ED ADULT NURSE NOTE - NSFALLRISKINTERV_ED_ALL_ED

## 2024-10-23 ENCOUNTER — NON-APPOINTMENT (OUTPATIENT)
Age: 44
End: 2024-10-23

## 2024-10-25 ENCOUNTER — APPOINTMENT (OUTPATIENT)
Dept: INTERNAL MEDICINE | Facility: CLINIC | Age: 44
End: 2024-10-25

## 2025-01-13 ENCOUNTER — APPOINTMENT (OUTPATIENT)
Dept: ULTRASOUND IMAGING | Facility: CLINIC | Age: 45
End: 2025-01-13
Payer: COMMERCIAL

## 2025-01-13 ENCOUNTER — APPOINTMENT (OUTPATIENT)
Dept: MAMMOGRAPHY | Facility: CLINIC | Age: 45
End: 2025-01-13
Payer: COMMERCIAL

## 2025-01-13 PROCEDURE — 77067 SCR MAMMO BI INCL CAD: CPT

## 2025-01-13 PROCEDURE — 77063 BREAST TOMOSYNTHESIS BI: CPT

## 2025-01-13 PROCEDURE — 76641 ULTRASOUND BREAST COMPLETE: CPT | Mod: 50

## 2025-07-07 ENCOUNTER — APPOINTMENT (OUTPATIENT)
Dept: INTERNAL MEDICINE | Facility: CLINIC | Age: 45
End: 2025-07-07
Payer: COMMERCIAL

## 2025-07-07 VITALS — WEIGHT: 166 LBS | BODY MASS INDEX: 30.55 KG/M2 | HEIGHT: 62 IN | RESPIRATION RATE: 16 BRPM

## 2025-07-07 PROBLEM — E66.3 OVERWEIGHT: Status: ACTIVE | Noted: 2025-07-07

## 2025-07-07 PROCEDURE — 93000 ELECTROCARDIOGRAM COMPLETE: CPT

## 2025-07-07 PROCEDURE — 99396 PREV VISIT EST AGE 40-64: CPT

## 2025-07-07 PROCEDURE — 36415 COLL VENOUS BLD VENIPUNCTURE: CPT

## 2025-07-07 RX ORDER — UBROGEPANT 50 MG/1
50 TABLET ORAL
Refills: 0 | Status: ACTIVE | COMMUNITY
Start: 2025-07-07

## 2025-07-07 RX ORDER — SERTRALINE HYDROCHLORIDE 100 MG/1
100 TABLET, FILM COATED ORAL
Qty: 90 | Refills: 3 | Status: ACTIVE | COMMUNITY
Start: 2025-07-07 | End: 1900-01-01

## 2025-07-07 RX ORDER — SERTRALINE HYDROCHLORIDE 50 MG/1
50 TABLET, FILM COATED ORAL
Qty: 90 | Refills: 3 | Status: ACTIVE | COMMUNITY
Start: 2025-07-07 | End: 1900-01-01

## 2025-07-09 ENCOUNTER — NON-APPOINTMENT (OUTPATIENT)
Age: 45
End: 2025-07-09

## 2025-07-09 VITALS — SYSTOLIC BLOOD PRESSURE: 120 MMHG | DIASTOLIC BLOOD PRESSURE: 78 MMHG

## 2025-07-09 LAB
ALBUMIN SERPL ELPH-MCNC: 4.4 G/DL
ALP BLD-CCNC: 61 U/L
ALT SERPL-CCNC: 17 U/L
ANION GAP SERPL CALC-SCNC: 15 MMOL/L
APPEARANCE: CLEAR
AST SERPL-CCNC: 25 U/L
BACTERIA: NEGATIVE /HPF
BASOPHILS # BLD AUTO: 0.04 K/UL
BASOPHILS NFR BLD AUTO: 0.5 %
BILIRUB SERPL-MCNC: 0.3 MG/DL
BILIRUBIN URINE: NEGATIVE
BLOOD URINE: NEGATIVE
BUN SERPL-MCNC: 9 MG/DL
CALCIUM SERPL-MCNC: 8.9 MG/DL
CAST: 0 /LPF
CHLORIDE SERPL-SCNC: 103 MMOL/L
CHOLEST SERPL-MCNC: 189 MG/DL
CO2 SERPL-SCNC: 20 MMOL/L
COLOR: YELLOW
CREAT SERPL-MCNC: 0.58 MG/DL
EGFRCR SERPLBLD CKD-EPI 2021: 114 ML/MIN/1.73M2
EOSINOPHIL # BLD AUTO: 0.33 K/UL
EOSINOPHIL NFR BLD AUTO: 4.4 %
EPITHELIAL CELLS: 0 /HPF
ESTIMATED AVERAGE GLUCOSE: 94 MG/DL
FERRITIN SERPL-MCNC: 88 NG/ML
GLUCOSE QUALITATIVE U: NEGATIVE MG/DL
GLUCOSE SERPL-MCNC: 84 MG/DL
HBA1C MFR BLD HPLC: 4.9 %
HCT VFR BLD CALC: 44.8 %
HDLC SERPL-MCNC: 88 MG/DL
HGB BLD-MCNC: 14.6 G/DL
IMM GRANULOCYTES NFR BLD AUTO: 0.3 %
IRON SATN MFR SERPL: 42 %
IRON SERPL-MCNC: 142 UG/DL
KETONES URINE: NEGATIVE MG/DL
LDLC SERPL-MCNC: 91 MG/DL
LEUKOCYTE ESTERASE URINE: NEGATIVE
LYMPHOCYTES # BLD AUTO: 1.77 K/UL
LYMPHOCYTES NFR BLD AUTO: 23.6 %
MAN DIFF?: NORMAL
MCHC RBC-ENTMCNC: 30.3 PG
MCHC RBC-ENTMCNC: 32.6 G/DL
MCV RBC AUTO: 92.9 FL
MICROSCOPIC-UA: NORMAL
MONOCYTES # BLD AUTO: 0.58 K/UL
MONOCYTES NFR BLD AUTO: 7.7 %
NEUTROPHILS # BLD AUTO: 4.77 K/UL
NEUTROPHILS NFR BLD AUTO: 63.5 %
NITRITE URINE: NEGATIVE
NONHDLC SERPL-MCNC: 101 MG/DL
PH URINE: 7
PLATELET # BLD AUTO: 231 K/UL
POTASSIUM SERPL-SCNC: 4.6 MMOL/L
PROT SERPL-MCNC: 6.9 G/DL
PROTEIN URINE: NEGATIVE MG/DL
RBC # BLD: 4.82 M/UL
RBC # FLD: 14.5 %
RED BLOOD CELLS URINE: 2 /HPF
SODIUM SERPL-SCNC: 138 MMOL/L
SPECIFIC GRAVITY URINE: 1.01
T4 FREE SERPL-MCNC: 1 NG/DL
TIBC SERPL-MCNC: 340 UG/DL
TRIGL SERPL-MCNC: 53 MG/DL
TSH SERPL-ACNC: 2.57 UIU/ML
UIBC SERPL-MCNC: 198 UG/DL
UROBILINOGEN URINE: 0.2 MG/DL
VIT B12 SERPL-MCNC: 559 PG/ML
WBC # FLD AUTO: 7.51 K/UL
WHITE BLOOD CELLS URINE: 0 /HPF

## 2025-07-10 RX ORDER — TIRZEPATIDE 2.5 MG/.5ML
2.5 INJECTION, SOLUTION SUBCUTANEOUS
Qty: 1 | Refills: 1 | Status: ACTIVE | COMMUNITY
Start: 2025-07-07

## 2025-07-23 ENCOUNTER — TRANSCRIPTION ENCOUNTER (OUTPATIENT)
Age: 45
End: 2025-07-23

## 2025-08-26 RX ORDER — SEMAGLUTIDE 0.25 MG/.5ML
0.25 INJECTION, SOLUTION SUBCUTANEOUS
Qty: 1 | Refills: 0 | Status: ACTIVE | COMMUNITY
Start: 2025-08-25 | End: 1900-01-01

## 2025-08-28 ENCOUNTER — TRANSCRIPTION ENCOUNTER (OUTPATIENT)
Age: 45
End: 2025-08-28

## 2025-09-08 ENCOUNTER — TRANSCRIPTION ENCOUNTER (OUTPATIENT)
Age: 45
End: 2025-09-08